# Patient Record
Sex: FEMALE | Race: WHITE | NOT HISPANIC OR LATINO | Employment: FULL TIME | URBAN - METROPOLITAN AREA
[De-identification: names, ages, dates, MRNs, and addresses within clinical notes are randomized per-mention and may not be internally consistent; named-entity substitution may affect disease eponyms.]

---

## 2018-11-15 ENCOUNTER — OFFICE VISIT (OUTPATIENT)
Dept: URGENT CARE | Facility: CLINIC | Age: 33
End: 2018-11-15
Payer: COMMERCIAL

## 2018-11-15 VITALS
WEIGHT: 190 LBS | SYSTOLIC BLOOD PRESSURE: 109 MMHG | DIASTOLIC BLOOD PRESSURE: 68 MMHG | RESPIRATION RATE: 17 BRPM | HEIGHT: 64 IN | TEMPERATURE: 98 F | OXYGEN SATURATION: 98 % | BODY MASS INDEX: 32.44 KG/M2 | HEART RATE: 73 BPM

## 2018-11-15 DIAGNOSIS — N94.6 DYSMENORRHEA: ICD-10-CM

## 2018-11-15 DIAGNOSIS — R10.9 ABDOMINAL CRAMPING: Primary | ICD-10-CM

## 2018-11-15 DIAGNOSIS — Z87.59 HISTORY OF MISCARRIAGE: ICD-10-CM

## 2018-11-15 LAB
B-HCG UR QL: NEGATIVE
BILIRUB UR QL STRIP: NEGATIVE
CTP QC/QA: YES
GLUCOSE UR QL STRIP: NEGATIVE
KETONES UR QL STRIP: NEGATIVE
LEUKOCYTE ESTERASE UR QL STRIP: NEGATIVE
PH, POC UA: 5.5 (ref 5–8)
POC BLOOD, URINE: POSITIVE
POC NITRATES, URINE: NEGATIVE
PROT UR QL STRIP: NEGATIVE
SP GR UR STRIP: 1.02 (ref 1–1.03)
UROBILINOGEN UR STRIP-ACNC: ABNORMAL (ref 0.1–1.1)

## 2018-11-15 PROCEDURE — 81003 URINALYSIS AUTO W/O SCOPE: CPT | Mod: QW,S$GLB,, | Performed by: NURSE PRACTITIONER

## 2018-11-15 PROCEDURE — 99203 OFFICE O/P NEW LOW 30 MIN: CPT | Mod: 25,S$GLB,, | Performed by: NURSE PRACTITIONER

## 2018-11-15 PROCEDURE — 3008F BODY MASS INDEX DOCD: CPT | Mod: CPTII,S$GLB,, | Performed by: NURSE PRACTITIONER

## 2018-11-15 PROCEDURE — 81025 URINE PREGNANCY TEST: CPT | Mod: S$GLB,,, | Performed by: NURSE PRACTITIONER

## 2018-11-15 RX ORDER — NAPROXEN 500 MG/1
500 TABLET ORAL 2 TIMES DAILY WITH MEALS
Qty: 20 TABLET | Refills: 0 | Status: SHIPPED | OUTPATIENT
Start: 2018-11-15 | End: 2018-11-25

## 2018-11-15 NOTE — PATIENT INSTRUCTIONS
Follow up with your doctor in a few days.  Return to the urgent care or go to the ER if symptoms get worse.    Take naproxyn as directed.  Follow up with your ob/gyn as discussed for further testing-ultrasound, possible birth control pills to help with period flow and cramps.          Unknown Causes of Abdominal Pain (Female)    The exact cause of your abdominal (stomach) pain is not clear. This does not mean that this is something to worry about. Everyone likes to know the exact cause of the problem, but sometimes with abdominal pain, there is no clear-cut cause, and this could be a good thing. The good news is that your symptoms can be treated, and you will feel better.   Your condition does not seem serious now; however, sometimes the signs of a serious problem may take more time to appear. For this reason, it is important for you to watch for any new symptoms, problems, or worsening of your condition.  Over the next few days, the abdominal pain may come and go, or be continuous. Other common symptoms can include nausea and vomiting. Sometimes it can be difficult to tell if you feel nauseous, you may just feel bad and not associate that feeling with nausea. Constipation, diarrhea, and a fever may go along with the pain.  The pain may continue even if treated correctly over the following days. Depending on how things go, sometimes the cause can become clear and may require further or different treatment. Additional evaluations, medications, or tests may also be needed.  Home care  Your healthcare provider may prescribe medicine for pain, symptoms, or an infection.  Follow the healthcare provider's instructions for taking these medicines.  General care  · Rest as much as you can until your next exam. No strenuous activities.  · Try to find positions that ease discomfort. A small pillow placed on the abdomen may help relieve pain.  · Something warm on your abdomen (such as a heating pad) may help, but be careful not  to burn yourself.  Diet  · Do not force yourself to eat, especially if having cramps, vomiting, or diarrhea.  · Water is important so you do not get dehydrated. Soup may also be good. Sports drinks may also help, especially if they are not too acidic. Make sure you don't drink sugary drinks as this can make things worse. Take liquids in small amounts. Do not guzzle them.  · Caffeine sometimes makes the pain and cramping worse.  · Avoid dairy products if you have vomiting or diarrhea.  · Don't eat large amounts at a time. Wait a few minutes between bites.  · Eat a diet low in fiber (called a low-residue diet). Foods allowed include refined breads, white rice, fruit and vegetable juices without pulp, tender meats. These foods will pass more easily through the intestine.  · Avoid whole-grain foods, whole fruits and vegetables, meats, seeds and nuts, fried or fatty foods, dairy, alcohol and spicy foods until your symptoms go away.  Follow-up care  Follow up with your healthcare provider, or as advised, if your pain does not begin to improve in the next 24 hours.  Call 911  Call 911 if any of these occur:  · Trouble breathing  · Confusion  · Fainting or loss of consciousness  · Rapid heart rate  · Seizure  When to seek medical advice  Call your healthcare provider right away if any of these occur:  · Pain gets worse or moves to the right lower abdomen  · New or worsening vomiting or diarrhea  · Swelling of the abdomen  · Unable to pass stool for more than 3 days  · Fever of 100.4ºF (38ºC) or higher, or as directed by your healthcare provider.  · Blood in vomit or bowel movements (dark red or black color)  · Jaundice (yellow color of eyes and skin)  · Weakness, dizziness  · Chest, arm, back, neck or jaw pain  · Unexpected vaginal bleeding or missed period  · Can't keep down liquids or water and are getting dehydrated  Date Last Reviewed: 12/30/2015  © 4058-1322 [employer]. [address] [place] [address]  [place] [address]. All rights reserved. This information is not intended as a substitute for professional medical care. Always follow your healthcare professional's instructions.

## 2018-11-15 NOTE — PROGRESS NOTES
"Subjective:       Patient ID: Nimo Miller is a 33 y.o. female.    Vitals:  height is 5' 4" (1.626 m) and weight is 86.2 kg (190 lb). Her oral temperature is 98.4 °F (36.9 °C). Her blood pressure is 109/68 and her pulse is 73. Her respiration is 17 and oxygen saturation is 98%.     Chief Complaint: Dysmenorrhea (clots and miscairrage recently)    Onset from today, passed clots. Reports miscarriage approx 4 months ago with spontaneous resolve-no medication or surgery needed. She started having menses approx 4-6 weeks post miscarriage-reports irregular but light and minimal cramping. Reports this morning started with menses again, cramping, more so on right side, and passing a clot around the size of 1inch. She is currently not on birth control.       Vaginal Bleeding   The patient's primary symptoms include pelvic pain and vaginal bleeding. The patient's pertinent negatives include no missed menses or vaginal discharge. This is a new problem. The current episode started today. The problem occurs constantly. The problem has been unchanged. The pain is severe. Pertinent negatives include no abdominal pain, back pain, chills, dysuria, fever, frequency, hematuria, nausea, rash, urgency or vomiting. The vaginal discharge was bloody. The vaginal bleeding is typical of menses. She has been passing clots. She has not been passing tissue. Nothing aggravates the symptoms. She has tried nothing for the symptoms. She is sexually active. It is unknown whether or not her partner has an STD. She uses nothing for contraception. Her menstrual history has been regular. There is no history of ovarian cysts.       Constitution: Negative for chills and fever.   Neck: Negative for painful lymph nodes.   Gastrointestinal: Negative for abdominal pain, nausea and vomiting.   Genitourinary: Positive for painful menstruation, vaginal bleeding and pelvic pain. Negative for dysuria, frequency, urgency, urine decreased, hematuria, " history of kidney stones, irregular menstruation, missed menses, heavy menstrual bleeding, ovarian cysts, genital trauma, vaginal pain, vaginal discharge, vaginal sores, vaginal odor, painful intercourse, genital sore and painful ejaculation.   Musculoskeletal: Negative for back pain.   Skin: Negative for rash and lesion.   Hematologic/Lymphatic: Negative for swollen lymph nodes.       Objective:      Physical Exam   Constitutional: She is oriented to person, place, and time. She appears well-developed and well-nourished.   HENT:   Head: Normocephalic and atraumatic.   Right Ear: External ear normal.   Left Ear: External ear normal.   Nose: Nose normal.   Eyes: Lids are normal.   Neck: Trachea normal, normal range of motion and phonation normal. Neck supple.   Cardiovascular: Normal pulses.   Pulmonary/Chest: Effort normal.   Abdominal: Soft. Normal appearance and bowel sounds are normal. She exhibits no distension. There is tenderness in the suprapubic area. There is no CVA tenderness.       Neurological: She is alert and oriented to person, place, and time.   Skin: Skin is warm, dry and intact.   Psychiatric: She has a normal mood and affect. Her speech is normal and behavior is normal. Cognition and memory are normal.   Nursing note and vitals reviewed.      Results for orders placed or performed in visit on 11/15/18   POCT Urinalysis, Dipstick, Automated, W/O Scope   Result Value Ref Range    POC Blood, Urine Positive (A) Negative    POC Bilirubin, Urine Negative Negative    POC Urobilinogen, Urine norm 0.1 - 1.1    POC Ketones, Urine Negative Negative    POC Protein, Urine Negative Negative    POC Nitrates, Urine Negative Negative    POC Glucose, Urine Negative Negative    pH, UA 5.5 5 - 8    POC Specific Gravity, Urine 1.020 1.003 - 1.029    POC Leukocytes, Urine Negative Negative   POCT urine pregnancy   Result Value Ref Range    POC Preg Test, Ur Negative Negative     Acceptable Yes         Assessment:       1. Abdominal cramping    2. Dysmenorrhea    3. History of miscarriage        Plan:     advised to follow up with her gyn this week for further workup of dysmenorrhea. ER precautions discussed.    Abdominal cramping  -     POCT Urinalysis, Dipstick, Automated, W/O Scope  -     POCT urine pregnancy  -     naproxen (NAPROSYN) 500 MG tablet; Take 1 tablet (500 mg total) by mouth 2 (two) times daily with meals. for 10 days  Dispense: 20 tablet; Refill: 0    Dysmenorrhea  -     naproxen (NAPROSYN) 500 MG tablet; Take 1 tablet (500 mg total) by mouth 2 (two) times daily with meals. for 10 days  Dispense: 20 tablet; Refill: 0    History of miscarriage      Patient Instructions   Follow up with your doctor in a few days.  Return to the urgent care or go to the ER if symptoms get worse.    Take naproxyn as directed.  Follow up with your ob/gyn as discussed for further testing-ultrasound, possible birth control pills to help with period flow and cramps.          Unknown Causes of Abdominal Pain (Female)    The exact cause of your abdominal (stomach) pain is not clear. This does not mean that this is something to worry about. Everyone likes to know the exact cause of the problem, but sometimes with abdominal pain, there is no clear-cut cause, and this could be a good thing. The good news is that your symptoms can be treated, and you will feel better.   Your condition does not seem serious now; however, sometimes the signs of a serious problem may take more time to appear. For this reason, it is important for you to watch for any new symptoms, problems, or worsening of your condition.  Over the next few days, the abdominal pain may come and go, or be continuous. Other common symptoms can include nausea and vomiting. Sometimes it can be difficult to tell if you feel nauseous, you may just feel bad and not associate that feeling with nausea. Constipation, diarrhea, and a fever may go along with the  pain.  The pain may continue even if treated correctly over the following days. Depending on how things go, sometimes the cause can become clear and may require further or different treatment. Additional evaluations, medications, or tests may also be needed.  Home care  Your healthcare provider may prescribe medicine for pain, symptoms, or an infection.  Follow the healthcare provider's instructions for taking these medicines.  General care  · Rest as much as you can until your next exam. No strenuous activities.  · Try to find positions that ease discomfort. A small pillow placed on the abdomen may help relieve pain.  · Something warm on your abdomen (such as a heating pad) may help, but be careful not to burn yourself.  Diet  · Do not force yourself to eat, especially if having cramps, vomiting, or diarrhea.  · Water is important so you do not get dehydrated. Soup may also be good. Sports drinks may also help, especially if they are not too acidic. Make sure you don't drink sugary drinks as this can make things worse. Take liquids in small amounts. Do not guzzle them.  · Caffeine sometimes makes the pain and cramping worse.  · Avoid dairy products if you have vomiting or diarrhea.  · Don't eat large amounts at a time. Wait a few minutes between bites.  · Eat a diet low in fiber (called a low-residue diet). Foods allowed include refined breads, white rice, fruit and vegetable juices without pulp, tender meats. These foods will pass more easily through the intestine.  · Avoid whole-grain foods, whole fruits and vegetables, meats, seeds and nuts, fried or fatty foods, dairy, alcohol and spicy foods until your symptoms go away.  Follow-up care  Follow up with your healthcare provider, or as advised, if your pain does not begin to improve in the next 24 hours.  Call 911  Call 911 if any of these occur:  · Trouble breathing  · Confusion  · Fainting or loss of consciousness  · Rapid heart rate  · Seizure  When to seek  medical advice  Call your healthcare provider right away if any of these occur:  · Pain gets worse or moves to the right lower abdomen  · New or worsening vomiting or diarrhea  · Swelling of the abdomen  · Unable to pass stool for more than 3 days  · Fever of 100.4ºF (38ºC) or higher, or as directed by your healthcare provider.  · Blood in vomit or bowel movements (dark red or black color)  · Jaundice (yellow color of eyes and skin)  · Weakness, dizziness  · Chest, arm, back, neck or jaw pain  · Unexpected vaginal bleeding or missed period  · Can't keep down liquids or water and are getting dehydrated  Date Last Reviewed: 12/30/2015  © 6241-4339 [employer]. [address] [place] [address], [place] [address]. All rights reserved. This information is not intended as a substitute for professional medical care. Always follow your healthcare professional's instructions.

## 2019-08-14 ENCOUNTER — OFFICE VISIT (OUTPATIENT)
Dept: URGENT CARE | Facility: CLINIC | Age: 34
End: 2019-08-14
Payer: COMMERCIAL

## 2019-08-14 VITALS
HEART RATE: 80 BPM | OXYGEN SATURATION: 100 % | TEMPERATURE: 98 F | HEIGHT: 64 IN | WEIGHT: 190.06 LBS | BODY MASS INDEX: 32.45 KG/M2 | SYSTOLIC BLOOD PRESSURE: 112 MMHG | DIASTOLIC BLOOD PRESSURE: 66 MMHG | RESPIRATION RATE: 20 BRPM

## 2019-08-14 DIAGNOSIS — R09.81 SINUS CONGESTION: ICD-10-CM

## 2019-08-14 DIAGNOSIS — J02.9 VIRAL PHARYNGITIS: Primary | ICD-10-CM

## 2019-08-14 LAB
CTP QC/QA: YES
S PYO RRNA THROAT QL PROBE: NEGATIVE

## 2019-08-14 PROCEDURE — 87880 POCT RAPID STREP A: ICD-10-PCS | Mod: QW,S$GLB,, | Performed by: NURSE PRACTITIONER

## 2019-08-14 PROCEDURE — 99213 PR OFFICE/OUTPT VISIT, EST, LEVL III, 20-29 MIN: ICD-10-PCS | Mod: S$GLB,,, | Performed by: NURSE PRACTITIONER

## 2019-08-14 PROCEDURE — 3008F BODY MASS INDEX DOCD: CPT | Mod: CPTII,S$GLB,, | Performed by: NURSE PRACTITIONER

## 2019-08-14 PROCEDURE — 3008F PR BODY MASS INDEX (BMI) DOCUMENTED: ICD-10-PCS | Mod: CPTII,S$GLB,, | Performed by: NURSE PRACTITIONER

## 2019-08-14 PROCEDURE — 99213 OFFICE O/P EST LOW 20 MIN: CPT | Mod: S$GLB,,, | Performed by: NURSE PRACTITIONER

## 2019-08-14 PROCEDURE — 87880 STREP A ASSAY W/OPTIC: CPT | Mod: QW,S$GLB,, | Performed by: NURSE PRACTITIONER

## 2019-08-14 NOTE — PATIENT INSTRUCTIONS
Please follow up with your Primary care provider within 2-5 days if your signs and symptoms have not resolved or worsen.     If your condition worsens or fails to improve we recommend that you receive another evaluation at the emergency room immediately or contact your primary medical clinic to discuss your concerns.   You must understand that you have received an Urgent Care treatment only and that you may be released before all of your medical problems are known or treated. You, the patient, will arrange for follow up care as instructed.     RED FLAGS/WARNING SYMPTOMS DISCUSSED WITH PATIENT THAT WOULD WARRANT EMERGENT MEDICAL ATTENTION. PATIENT VERBALIZED UNDERSTANDING.       Self-Care for Sore Throats    Sore throats happen for many reasons, such as colds, allergies, and infections caused by viruses or bacteria. In any case, your throat becomes red and sore. Your goal for self-care is to reduce your discomfort while giving your throat a chance to heal.  Moisten and soothe your throat  Tips include the following:  · Try a sip of water first thing after waking up.  · Keep your throat moist by drinking 6 or more glasses of clear liquids every day.  · Run a cool-air humidifier in your room overnight.  · Avoid cigarette smoke.   · Suck on throat lozenges, cough drops, hard candy, ice chips, or frozen fruit-juice bars. Use the sugar-free versions if your diet or medical condition requires them.  Gargle to ease irritation  Gargling every hour or 2 can ease irritation. Try gargling with 1 of these solutions:  · 1/4 teaspoon of salt in 1/2 cup of warm water  · An over-the-counter anesthetic gargle  Use medicine for more relief  Over-the-counter medicine can reduce sore throat symptoms. Ask your pharmacist if you have questions about which medicine to use:  · Ease pain with anesthetic sprays. Aspirin or an aspirin substitute also helps. Remember, never give aspirin to anyone 18 or younger, or if you are already taking  blood thinners.   · For sore throats caused by allergies, try antihistamines to block the allergic reaction.  · Remember: unless a sore throat is caused by a bacterial infection, antibiotics wont help you.  Prevent future sore throats  Prevention tips include the following:  · Stop smoking or reduce contact with secondhand smoke. Smoke irritates the tender throat lining.  · Limit contact with pets and with allergy-causing substances, such as pollen and mold.  · When youre around someone with a sore throat or cold, wash your hands often to keep viruses or bacteria from spreading.  · Dont strain your vocal cords.  Call your healthcare provider  Contact your healthcare provider if you have:  · A temperature over 101°F (38.3°C)  · White spots on the throat  · Great difficulty swallowing  · Trouble breathing  · A skin rash  · Recent exposure to someone else with strep bacteria  · Severe hoarseness and swollen glands in the neck or jaw   Date Last Reviewed: 8/1/2016  © 9101-9843 [employer]. [address] [place] [address], [place] [address]. All rights reserved. This information is not intended as a substitute for professional medical care. Always follow your healthcare professional's instructions.

## 2019-08-14 NOTE — PROGRESS NOTES
"Subjective:       Patient ID: Nimo Miller is a 34 y.o. female.    Vitals:  height is 5' 4" (1.626 m) and weight is 86.2 kg (190 lb 0.6 oz). Her oral temperature is 97.7 °F (36.5 °C). Her blood pressure is 112/66 and her pulse is 80. Her respiration is 20 and oxygen saturation is 100%.     Chief Complaint: Sore Throat    Patient here today stating her Son tested positive to Strep today. Patient states she is not feeling that great and he OB/GYN wanted her to come in.    Sore Throat    This is a new problem. The current episode started yesterday. The problem has been gradually worsening. Associated symptoms include ear pain. Pertinent negatives include no congestion, coughing, diarrhea, headaches, shortness of breath or vomiting. Associated symptoms comments: mild. She has had exposure to strep. Exposure to: Son. She has tried nothing for the symptoms.       Constitution: Negative for chills, fatigue and fever.   HENT: Positive for ear pain and sore throat. Negative for congestion.    Neck: Negative for painful lymph nodes.   Cardiovascular: Negative for chest pain and leg swelling.   Eyes: Negative for double vision and blurred vision.   Respiratory: Negative for cough and shortness of breath.    Gastrointestinal: Negative for nausea, vomiting and diarrhea.   Genitourinary: Negative for dysuria, frequency, urgency and history of kidney stones.   Musculoskeletal: Negative for joint pain, joint swelling, muscle cramps and muscle ache.   Skin: Negative for color change, pale, rash and bruising.   Allergic/Immunologic: Negative for seasonal allergies.   Neurological: Negative for dizziness, history of vertigo, light-headedness, passing out and headaches.   Hematologic/Lymphatic: Negative for swollen lymph nodes.   Psychiatric/Behavioral: Negative for nervous/anxious, sleep disturbance and depression. The patient is not nervous/anxious.        Objective:      Physical Exam   Constitutional: She is oriented to " person, place, and time. She appears well-developed and well-nourished. She is cooperative.  Non-toxic appearance. She does not appear ill. No distress.   HENT:   Head: Normocephalic and atraumatic.   Right Ear: Hearing, tympanic membrane, external ear and ear canal normal.   Left Ear: Hearing, tympanic membrane, external ear and ear canal normal.   Nose: Nose normal. No mucosal edema, rhinorrhea or nasal deformity. No epistaxis. Right sinus exhibits no maxillary sinus tenderness and no frontal sinus tenderness. Left sinus exhibits no maxillary sinus tenderness and no frontal sinus tenderness.   Mouth/Throat: Uvula is midline and mucous membranes are normal. No trismus in the jaw. Normal dentition. No uvula swelling. Posterior oropharyngeal edema and posterior oropharyngeal erythema present. No oropharyngeal exudate. No tonsillar exudate.   Eyes: Conjunctivae and lids are normal. Right eye exhibits no discharge. Left eye exhibits no discharge. No scleral icterus.   Sclera clear bilat   Neck: Trachea normal, normal range of motion, full passive range of motion without pain and phonation normal. Neck supple.   Cardiovascular: Normal rate, regular rhythm, normal heart sounds, intact distal pulses and normal pulses.   Pulmonary/Chest: Effort normal and breath sounds normal. No respiratory distress.   Abdominal: Soft. Normal appearance and bowel sounds are normal. She exhibits no distension, no pulsatile midline mass and no mass. There is no tenderness.   Musculoskeletal: Normal range of motion. She exhibits no edema or deformity.   Neurological: She is alert and oriented to person, place, and time. She exhibits normal muscle tone. Coordination normal.   Skin: Skin is warm, dry and intact. She is not diaphoretic. No pallor.   Psychiatric: She has a normal mood and affect. Her speech is normal and behavior is normal. Judgment and thought content normal. Cognition and memory are normal.   Nursing note and vitals  reviewed.      Assessment:       1. Viral pharyngitis    2. Sinus congestion        Plan:         Viral pharyngitis  -     POCT rapid strep A    Sinus congestion      Results for orders placed or performed in visit on 08/14/19   POCT rapid strep A   Result Value Ref Range    Rapid Strep A Screen Negative Negative     Acceptable Yes          Please follow up with your Primary care provider within 2-5 days if your signs and symptoms have not resolved or worsen.     If your condition worsens or fails to improve we recommend that you receive another evaluation at the emergency room immediately or contact your primary medical clinic to discuss your concerns.   You must understand that you have received an Urgent Care treatment only and that you may be released before all of your medical problems are known or treated. You, the patient, will arrange for follow up care as instructed.     RED FLAGS/WARNING SYMPTOMS DISCUSSED WITH PATIENT THAT WOULD WARRANT EMERGENT MEDICAL ATTENTION. PATIENT VERBALIZED UNDERSTANDING.       Self-Care for Sore Throats    Sore throats happen for many reasons, such as colds, allergies, and infections caused by viruses or bacteria. In any case, your throat becomes red and sore. Your goal for self-care is to reduce your discomfort while giving your throat a chance to heal.  Moisten and soothe your throat  Tips include the following:  · Try a sip of water first thing after waking up.  · Keep your throat moist by drinking 6 or more glasses of clear liquids every day.  · Run a cool-air humidifier in your room overnight.  · Avoid cigarette smoke.   · Suck on throat lozenges, cough drops, hard candy, ice chips, or frozen fruit-juice bars. Use the sugar-free versions if your diet or medical condition requires them.  Gargle to ease irritation  Gargling every hour or 2 can ease irritation. Try gargling with 1 of these solutions:  · 1/4 teaspoon of salt in 1/2 cup of warm water  · An  over-the-counter anesthetic gargle  Use medicine for more relief  Over-the-counter medicine can reduce sore throat symptoms. Ask your pharmacist if you have questions about which medicine to use:  · Ease pain with anesthetic sprays. Aspirin or an aspirin substitute also helps. Remember, never give aspirin to anyone 18 or younger, or if you are already taking blood thinners.   · For sore throats caused by allergies, try antihistamines to block the allergic reaction.  · Remember: unless a sore throat is caused by a bacterial infection, antibiotics wont help you.  Prevent future sore throats  Prevention tips include the following:  · Stop smoking or reduce contact with secondhand smoke. Smoke irritates the tender throat lining.  · Limit contact with pets and with allergy-causing substances, such as pollen and mold.  · When youre around someone with a sore throat or cold, wash your hands often to keep viruses or bacteria from spreading.  · Dont strain your vocal cords.  Call your healthcare provider  Contact your healthcare provider if you have:  · A temperature over 101°F (38.3°C)  · White spots on the throat  · Great difficulty swallowing  · Trouble breathing  · A skin rash  · Recent exposure to someone else with strep bacteria  · Severe hoarseness and swollen glands in the neck or jaw   Date Last Reviewed: 8/1/2016  © 1679-4245 [employer]. [address] [place] [address], [place] [address]. All rights reserved. This information is not intended as a substitute for professional medical care. Always follow your healthcare professional's instructions.

## 2022-02-16 ENCOUNTER — OFFICE VISIT (OUTPATIENT)
Dept: FAMILY MEDICINE | Facility: CLINIC | Age: 37
End: 2022-02-16
Attending: FAMILY MEDICINE
Payer: COMMERCIAL

## 2022-02-16 ENCOUNTER — LAB VISIT (OUTPATIENT)
Dept: LAB | Facility: HOSPITAL | Age: 37
End: 2022-02-16
Attending: FAMILY MEDICINE
Payer: COMMERCIAL

## 2022-02-16 VITALS
DIASTOLIC BLOOD PRESSURE: 64 MMHG | BODY MASS INDEX: 36.14 KG/M2 | OXYGEN SATURATION: 98 % | WEIGHT: 211.69 LBS | SYSTOLIC BLOOD PRESSURE: 102 MMHG | HEART RATE: 85 BPM | HEIGHT: 64 IN

## 2022-02-16 DIAGNOSIS — F41.9 ANXIETY AND DEPRESSION: ICD-10-CM

## 2022-02-16 DIAGNOSIS — F32.A ANXIETY AND DEPRESSION: ICD-10-CM

## 2022-02-16 DIAGNOSIS — Z00.00 ANNUAL PHYSICAL EXAM: ICD-10-CM

## 2022-02-16 DIAGNOSIS — Z00.00 ANNUAL PHYSICAL EXAM: Primary | ICD-10-CM

## 2022-02-16 LAB
ALBUMIN SERPL BCP-MCNC: 3.9 G/DL (ref 3.5–5.2)
ALP SERPL-CCNC: 99 U/L (ref 55–135)
ALT SERPL W/O P-5'-P-CCNC: 12 U/L (ref 10–44)
ANION GAP SERPL CALC-SCNC: 9 MMOL/L (ref 8–16)
AST SERPL-CCNC: 18 U/L (ref 10–40)
BASOPHILS # BLD AUTO: 0.05 K/UL (ref 0–0.2)
BASOPHILS NFR BLD: 0.5 % (ref 0–1.9)
BILIRUB SERPL-MCNC: 0.2 MG/DL (ref 0.1–1)
BILIRUB UR QL STRIP: NEGATIVE
BUN SERPL-MCNC: 15 MG/DL (ref 6–20)
CALCIUM SERPL-MCNC: 9.9 MG/DL (ref 8.7–10.5)
CHLORIDE SERPL-SCNC: 104 MMOL/L (ref 95–110)
CLARITY UR REFRACT.AUTO: CLEAR
CO2 SERPL-SCNC: 27 MMOL/L (ref 23–29)
COLOR UR AUTO: COLORLESS
CREAT SERPL-MCNC: 0.8 MG/DL (ref 0.5–1.4)
DIFFERENTIAL METHOD: ABNORMAL
EOSINOPHIL # BLD AUTO: 0.3 K/UL (ref 0–0.5)
EOSINOPHIL NFR BLD: 2.7 % (ref 0–8)
ERYTHROCYTE [DISTWIDTH] IN BLOOD BY AUTOMATED COUNT: 12.6 % (ref 11.5–14.5)
EST. GFR  (AFRICAN AMERICAN): >60 ML/MIN/1.73 M^2
EST. GFR  (NON AFRICAN AMERICAN): >60 ML/MIN/1.73 M^2
GLUCOSE SERPL-MCNC: 75 MG/DL (ref 70–110)
GLUCOSE UR QL STRIP: NEGATIVE
HCT VFR BLD AUTO: 39 % (ref 37–48.5)
HGB BLD-MCNC: 12.3 G/DL (ref 12–16)
HGB UR QL STRIP: NEGATIVE
IMM GRANULOCYTES # BLD AUTO: 0.03 K/UL (ref 0–0.04)
IMM GRANULOCYTES NFR BLD AUTO: 0.3 % (ref 0–0.5)
KETONES UR QL STRIP: NEGATIVE
LEUKOCYTE ESTERASE UR QL STRIP: NEGATIVE
LYMPHOCYTES # BLD AUTO: 2.6 K/UL (ref 1–4.8)
LYMPHOCYTES NFR BLD: 25.4 % (ref 18–48)
MCH RBC QN AUTO: 29.6 PG (ref 27–31)
MCHC RBC AUTO-ENTMCNC: 31.5 G/DL (ref 32–36)
MCV RBC AUTO: 94 FL (ref 82–98)
MONOCYTES # BLD AUTO: 0.6 K/UL (ref 0.3–1)
MONOCYTES NFR BLD: 5.6 % (ref 4–15)
NEUTROPHILS # BLD AUTO: 6.8 K/UL (ref 1.8–7.7)
NEUTROPHILS NFR BLD: 65.5 % (ref 38–73)
NITRITE UR QL STRIP: NEGATIVE
NRBC BLD-RTO: 0 /100 WBC
PH UR STRIP: 7 [PH] (ref 5–8)
PLATELET # BLD AUTO: 304 K/UL (ref 150–450)
PMV BLD AUTO: 10.1 FL (ref 9.2–12.9)
POTASSIUM SERPL-SCNC: 4.2 MMOL/L (ref 3.5–5.1)
PROT SERPL-MCNC: 7.9 G/DL (ref 6–8.4)
PROT UR QL STRIP: NEGATIVE
RBC # BLD AUTO: 4.16 M/UL (ref 4–5.4)
SODIUM SERPL-SCNC: 140 MMOL/L (ref 136–145)
SP GR UR STRIP: 1 (ref 1–1.03)
TSH SERPL DL<=0.005 MIU/L-ACNC: 2.26 UIU/ML (ref 0.4–4)
URN SPEC COLLECT METH UR: ABNORMAL
WBC # BLD AUTO: 10.31 K/UL (ref 3.9–12.7)

## 2022-02-16 PROCEDURE — 81003 URINALYSIS AUTO W/O SCOPE: CPT | Performed by: FAMILY MEDICINE

## 2022-02-16 PROCEDURE — 1159F MED LIST DOCD IN RCRD: CPT | Mod: CPTII,S$GLB,, | Performed by: FAMILY MEDICINE

## 2022-02-16 PROCEDURE — 99385 PREV VISIT NEW AGE 18-39: CPT | Mod: S$GLB,,, | Performed by: FAMILY MEDICINE

## 2022-02-16 PROCEDURE — 87389 HIV-1 AG W/HIV-1&-2 AB AG IA: CPT | Performed by: FAMILY MEDICINE

## 2022-02-16 PROCEDURE — 36415 COLL VENOUS BLD VENIPUNCTURE: CPT | Mod: PO | Performed by: FAMILY MEDICINE

## 2022-02-16 PROCEDURE — 1160F PR REVIEW ALL MEDS BY PRESCRIBER/CLIN PHARMACIST DOCUMENTED: ICD-10-PCS | Mod: CPTII,S$GLB,, | Performed by: FAMILY MEDICINE

## 2022-02-16 PROCEDURE — 86803 HEPATITIS C AB TEST: CPT | Performed by: FAMILY MEDICINE

## 2022-02-16 PROCEDURE — 3008F BODY MASS INDEX DOCD: CPT | Mod: CPTII,S$GLB,, | Performed by: FAMILY MEDICINE

## 2022-02-16 PROCEDURE — 99999 PR PBB SHADOW E&M-EST. PATIENT-LVL III: ICD-10-PCS | Mod: PBBFAC,,, | Performed by: FAMILY MEDICINE

## 2022-02-16 PROCEDURE — 80053 COMPREHEN METABOLIC PANEL: CPT | Performed by: FAMILY MEDICINE

## 2022-02-16 PROCEDURE — 99385 PR PREVENTIVE VISIT,NEW,18-39: ICD-10-PCS | Mod: S$GLB,,, | Performed by: FAMILY MEDICINE

## 2022-02-16 PROCEDURE — 1159F PR MEDICATION LIST DOCUMENTED IN MEDICAL RECORD: ICD-10-PCS | Mod: CPTII,S$GLB,, | Performed by: FAMILY MEDICINE

## 2022-02-16 PROCEDURE — 99999 PR PBB SHADOW E&M-EST. PATIENT-LVL III: CPT | Mod: PBBFAC,,, | Performed by: FAMILY MEDICINE

## 2022-02-16 PROCEDURE — 84443 ASSAY THYROID STIM HORMONE: CPT | Performed by: FAMILY MEDICINE

## 2022-02-16 PROCEDURE — 3074F PR MOST RECENT SYSTOLIC BLOOD PRESSURE < 130 MM HG: ICD-10-PCS | Mod: CPTII,S$GLB,, | Performed by: FAMILY MEDICINE

## 2022-02-16 PROCEDURE — 85025 COMPLETE CBC W/AUTO DIFF WBC: CPT | Performed by: FAMILY MEDICINE

## 2022-02-16 PROCEDURE — 1160F RVW MEDS BY RX/DR IN RCRD: CPT | Mod: CPTII,S$GLB,, | Performed by: FAMILY MEDICINE

## 2022-02-16 PROCEDURE — 3078F DIAST BP <80 MM HG: CPT | Mod: CPTII,S$GLB,, | Performed by: FAMILY MEDICINE

## 2022-02-16 PROCEDURE — 3008F PR BODY MASS INDEX (BMI) DOCUMENTED: ICD-10-PCS | Mod: CPTII,S$GLB,, | Performed by: FAMILY MEDICINE

## 2022-02-16 PROCEDURE — 3074F SYST BP LT 130 MM HG: CPT | Mod: CPTII,S$GLB,, | Performed by: FAMILY MEDICINE

## 2022-02-16 PROCEDURE — 3078F PR MOST RECENT DIASTOLIC BLOOD PRESSURE < 80 MM HG: ICD-10-PCS | Mod: CPTII,S$GLB,, | Performed by: FAMILY MEDICINE

## 2022-02-16 RX ORDER — SERTRALINE HYDROCHLORIDE 100 MG/1
TABLET, FILM COATED ORAL
COMMUNITY
Start: 2022-01-05 | End: 2022-04-01 | Stop reason: SDUPTHER

## 2022-02-16 RX ORDER — SERTRALINE HYDROCHLORIDE 50 MG/1
50 TABLET, FILM COATED ORAL DAILY
COMMUNITY
End: 2022-04-01

## 2022-02-17 LAB — HIV 1+2 AB+HIV1 P24 AG SERPL QL IA: NEGATIVE

## 2022-02-18 LAB — HCV AB SERPL QL IA: NEGATIVE

## 2022-02-21 ENCOUNTER — TELEPHONE (OUTPATIENT)
Dept: FAMILY MEDICINE | Facility: CLINIC | Age: 37
End: 2022-02-21
Payer: COMMERCIAL

## 2022-02-21 NOTE — TELEPHONE ENCOUNTER
----- Message from Deborah Hernadez MD sent at 2/21/2022 10:13 AM CST -----  Regarding: results  Please let pt know nothing acute on labs good news

## 2022-02-21 NOTE — PROGRESS NOTES
"Subjective:       Patient ID: Nimo Miller is a 37 y.o. female.    Chief Complaint: Establish Care    HPI   Pt with depression followed in psych  is here for annual exam establish care.  No si/hi no panic attacks stable on ssri   pt is generally well no sob/cp no change in bowel habits no brbpr  Pt denies dysuria hematuria no abnl vaginal bleeding  Pt denies n/v/f/c/d/c no cough chest congestion no sore throat    Review of Systems   Constitutional: Negative for activity change, chills, diaphoresis, fatigue and fever.   HENT: Negative for congestion, ear discharge, ear pain, hearing loss, postnasal drip, rhinorrhea, sinus pressure, sneezing, sore throat, trouble swallowing and voice change.    Eyes: Negative for photophobia, discharge, redness, itching and visual disturbance.   Respiratory: Negative for cough, chest tightness, shortness of breath and wheezing.    Cardiovascular: Negative for chest pain, palpitations and leg swelling.   Gastrointestinal: Negative for abdominal pain, anal bleeding, blood in stool, constipation, diarrhea, nausea, rectal pain and vomiting.   Genitourinary: Negative for dyspareunia, dysuria, flank pain, frequency, hematuria, menstrual problem, pelvic pain, urgency, vaginal bleeding and vaginal discharge.   Musculoskeletal: Negative for arthralgias, back pain, joint swelling and neck pain.   Skin: Negative for color change and rash.   Neurological: Negative for dizziness, speech difficulty, weakness, light-headedness, numbness and headaches.   Hematological: Does not bruise/bleed easily.   Psychiatric/Behavioral: Negative for agitation, confusion, decreased concentration, sleep disturbance and suicidal ideas. The patient is not nervous/anxious.        Objective:     /64   Pulse 85   Ht 5' 4" (1.626 m)   Wt 96 kg (211 lb 11.2 oz)   LMP 01/26/2022   SpO2 98%   Breastfeeding Unknown   BMI 36.34 kg/m²     Physical Exam  Constitutional:       Appearance: Normal appearance. She " "is well-developed. She is not ill-appearing.   HENT:      Head: Normocephalic and atraumatic.      Right Ear: Tympanic membrane and external ear normal.      Left Ear: Tympanic membrane and external ear normal.      Nose: Nose normal.   Eyes:      General:         Right eye: No discharge.         Left eye: No discharge.      Conjunctiva/sclera: Conjunctivae normal.      Pupils: Pupils are equal, round, and reactive to light.   Neck:      Thyroid: No thyromegaly.   Cardiovascular:      Rate and Rhythm: Normal rate and regular rhythm.      Heart sounds: Normal heart sounds. No murmur heard.    No friction rub. No gallop.   Pulmonary:      Effort: Pulmonary effort is normal.      Breath sounds: Normal breath sounds. No wheezing or rales.   Abdominal:      General: Bowel sounds are normal. There is no distension.      Palpations: Abdomen is soft.      Tenderness: There is no abdominal tenderness. There is no guarding or rebound.   Genitourinary:     Vagina: Normal.      Comments: declined  Musculoskeletal:         General: No tenderness. Normal range of motion.      Cervical back: Normal range of motion and neck supple.   Lymphadenopathy:      Cervical: No cervical adenopathy.   Skin:     General: Skin is warm and dry.      Findings: No erythema or rash.   Neurological:      General: No focal deficit present.      Mental Status: She is alert.      Cranial Nerves: No cranial nerve deficit.      Motor: No abnormal muscle tone.      Coordination: Coordination normal.   Psychiatric:         Behavior: Behavior normal.         Thought Content: Thought content normal.         Judgment: Judgment normal.         Assessment:       1. Annual physical exam        Plan:     orderse cbc cmp lipid tsh urine  Cont meds  Low fat diet  Graded exercise  rtc annually     Health maintenance  Lipid ordered  Flu in fall  Tetanus q 10 years  papwith gyn  Breast exam with pap  Mammogram at 38       "This note will not be shared with the " "patient."     "

## 2022-04-01 ENCOUNTER — OFFICE VISIT (OUTPATIENT)
Dept: FAMILY MEDICINE | Facility: CLINIC | Age: 37
End: 2022-04-01
Attending: FAMILY MEDICINE
Payer: COMMERCIAL

## 2022-04-01 DIAGNOSIS — F32.A ANXIETY AND DEPRESSION: Primary | ICD-10-CM

## 2022-04-01 DIAGNOSIS — F41.9 ANXIETY AND DEPRESSION: Primary | ICD-10-CM

## 2022-04-01 PROCEDURE — 3008F BODY MASS INDEX DOCD: CPT | Mod: CPTII,95,, | Performed by: FAMILY MEDICINE

## 2022-04-01 PROCEDURE — 3008F PR BODY MASS INDEX (BMI) DOCUMENTED: ICD-10-PCS | Mod: CPTII,95,, | Performed by: FAMILY MEDICINE

## 2022-04-01 PROCEDURE — 99213 OFFICE O/P EST LOW 20 MIN: CPT | Mod: 95,,, | Performed by: FAMILY MEDICINE

## 2022-04-01 PROCEDURE — 99213 PR OFFICE/OUTPT VISIT, EST, LEVL III, 20-29 MIN: ICD-10-PCS | Mod: 95,,, | Performed by: FAMILY MEDICINE

## 2022-04-01 RX ORDER — SERTRALINE HYDROCHLORIDE 100 MG/1
200 TABLET, FILM COATED ORAL DAILY
Qty: 180 TABLET | Refills: 3 | Status: SHIPPED | OUTPATIENT
Start: 2022-04-01 | End: 2023-04-09

## 2022-04-19 VITALS — RESPIRATION RATE: 16 BRPM | TEMPERATURE: 99 F | WEIGHT: 211 LBS | HEIGHT: 64 IN | BODY MASS INDEX: 36.02 KG/M2

## 2022-04-19 PROBLEM — F32.A ANXIETY AND DEPRESSION: Status: ACTIVE | Noted: 2022-04-19

## 2022-04-19 PROBLEM — F41.9 ANXIETY AND DEPRESSION: Status: ACTIVE | Noted: 2022-04-19

## 2022-04-19 NOTE — PROGRESS NOTES
Subjective:    The patient location is: home  The chief complaint leading to consultation is: depression    Visit type: {TELE AUDIOVISUAL    Face to Face time with patient: 30 minutes of total time spent on the encounter, which includes face to face time and non-face to face time preparing to see the patient (eg, review of tests), Obtaining and/or reviewing separately obtained history, Documenting clinical information in the electronic or other health record, Independently interpreting results (not separately reported) and communicating results to the patient/family/caregiver, or Care coordination (not separately reported).         Each patient to whom he or she provides medical services by telemedicine is:  (1) informed of the relationship between the physician and patient and the respective role of any other health care provider with respect to management of the patient; and (2) notified that he or she may decline to receive medical services by telemedicine and may withdraw from such care at any time.    Notes:      Patient ID: Nimo Miller is a 37 y.o. female.    Chief Complaint: Anxiety    HPI   Pt is here for follow up of anxiety depression pt is on 100 mg and 50 mg of zoloft she feels it is not working as well as it has and would like to increase to 200 mg qd  Pt denies panic attacks no si/hi she has been followed in psych   Review of Systems   Constitutional: Positive for activity change. Negative for chills, fatigue, fever and unexpected weight change.   HENT: Negative for hearing loss, rhinorrhea and trouble swallowing.    Eyes: Negative for discharge and visual disturbance.   Respiratory: Negative for cough, chest tightness, shortness of breath and wheezing.    Cardiovascular: Negative for chest pain and palpitations.   Gastrointestinal: Negative for blood in stool, constipation, diarrhea and vomiting.   Endocrine: Negative for polydipsia and polyuria.   Genitourinary: Negative for menstrual problem.  "  Musculoskeletal: Negative for arthralgias, joint swelling and neck pain.   Psychiatric/Behavioral: Positive for dysphoric mood. Negative for confusion, sleep disturbance and suicidal ideas. The patient is not nervous/anxious.        Objective:     Temp 98.6 °F (37 °C)   Resp 16   Ht 5' 4" (1.626 m)   Wt 95.7 kg (211 lb)   BMI 36.22 kg/m²     Physical Exam  Constitutional:       Appearance: Normal appearance.   HENT:      Head: Normocephalic and atraumatic.      Nose: Nose normal.   Pulmonary:      Effort: Pulmonary effort is normal.   Neurological:      General: No focal deficit present.      Mental Status: She is alert and oriented to person, place, and time.      Cranial Nerves: No cranial nerve deficit.      Coordination: Coordination normal.   Psychiatric:         Mood and Affect: Mood normal.         Behavior: Behavior normal.         Thought Content: Thought content normal.         Judgment: Judgment normal.         Assessment:       1. Anxiety and depression        Plan:     orders declined  Cont meds  Avoid caffeine and etoh  Increase water intake  Graded exercise  rtc 1 month  F/u psych        "This note will not be shared with the patient."     "

## 2022-05-05 ENCOUNTER — OFFICE VISIT (OUTPATIENT)
Dept: FAMILY MEDICINE | Facility: CLINIC | Age: 37
End: 2022-05-05
Attending: FAMILY MEDICINE
Payer: COMMERCIAL

## 2022-05-05 DIAGNOSIS — F32.A ANXIETY AND DEPRESSION: Primary | ICD-10-CM

## 2022-05-05 DIAGNOSIS — F41.9 ANXIETY AND DEPRESSION: Primary | ICD-10-CM

## 2022-05-05 PROCEDURE — 99213 PR OFFICE/OUTPT VISIT, EST, LEVL III, 20-29 MIN: ICD-10-PCS | Mod: 95,,, | Performed by: FAMILY MEDICINE

## 2022-05-05 PROCEDURE — 1159F PR MEDICATION LIST DOCUMENTED IN MEDICAL RECORD: ICD-10-PCS | Mod: CPTII,95,, | Performed by: FAMILY MEDICINE

## 2022-05-05 PROCEDURE — 99213 OFFICE O/P EST LOW 20 MIN: CPT | Mod: 95,,, | Performed by: FAMILY MEDICINE

## 2022-05-05 PROCEDURE — 1160F RVW MEDS BY RX/DR IN RCRD: CPT | Mod: CPTII,95,, | Performed by: FAMILY MEDICINE

## 2022-05-05 PROCEDURE — 1159F MED LIST DOCD IN RCRD: CPT | Mod: CPTII,95,, | Performed by: FAMILY MEDICINE

## 2022-05-05 PROCEDURE — 1160F PR REVIEW ALL MEDS BY PRESCRIBER/CLIN PHARMACIST DOCUMENTED: ICD-10-PCS | Mod: CPTII,95,, | Performed by: FAMILY MEDICINE

## 2022-05-05 RX ORDER — DESOGESTREL AND ETHINYL ESTRADIOL 0.15-0.03
1 KIT ORAL DAILY
Qty: 30 TABLET | Refills: 11
Start: 2022-05-05 | End: 2023-10-23

## 2022-05-06 NOTE — PROGRESS NOTES
The patient location is: home  The chief complaint leading to consultation is: anxiety    Visit type: {TELE AUDIOVISUAL    Face to Face time with patient: 20 minutes of total time spent on the encounter, which includes face to face time and non-face to face time preparing to see the patient (eg, review of tests), Obtaining and/or reviewing separately obtained history, Documenting clinical information in the electronic or other health record, Independently interpreting results (not separately reported) and communicating results to the patient/family/caregiver, or Care coordination (not separately reported).         Each patient to whom he or she provides medical services by telemedicine is:  (1) informed of the relationship between the physician and patient and the respective role of any other health care provider with respect to management of the patient; and (2) notified that he or she may decline to receive medical services by telemedicine and may withdraw from such care at any time.    Notes:   Subjective:       Patient ID: Nimo Miller is a 37 y.o. female.    Chief Complaint: Anxiety    HPI   Pt in virtual visit for follow up of anxiety depression we increased her zoloft to 200 mt qd she is feeling better more in control but attributes it to a change in ocp's she is overall well no si/hi no panic attacks would like to continue same dose  Review of Systems   Constitutional: Positive for activity change. Negative for chills, fatigue, fever and unexpected weight change.   HENT: Negative for hearing loss, rhinorrhea and trouble swallowing.    Eyes: Negative for discharge and visual disturbance.   Respiratory: Negative for cough, chest tightness and wheezing.    Cardiovascular: Negative for chest pain and palpitations.   Gastrointestinal: Negative for blood in stool, constipation, diarrhea and vomiting.   Endocrine: Negative for polydipsia and polyuria.   Genitourinary: Negative for difficulty urinating,  "dysuria, hematuria and menstrual problem.   Musculoskeletal: Negative for arthralgias, joint swelling and neck pain.   Neurological: Negative for weakness and headaches.   Psychiatric/Behavioral: Negative for confusion, dysphoric mood, sleep disturbance and suicidal ideas. The patient is not nervous/anxious.        Objective:     Temp (P) 98.6 °F (37 °C)   Resp (P) 16   Ht (P) 5' 4" (1.626 m)   Wt (P) 90.7 kg (200 lb)   BMI (P) 34.33 kg/m²     Physical Exam  Constitutional:       Appearance: Normal appearance. She is not ill-appearing.   HENT:      Head: Normocephalic and atraumatic.      Nose: Nose normal.   Eyes:      Extraocular Movements: Extraocular movements intact.   Pulmonary:      Effort: Pulmonary effort is normal. No respiratory distress.   Neurological:      General: No focal deficit present.      Mental Status: She is alert and oriented to person, place, and time.      Cranial Nerves: No cranial nerve deficit.      Coordination: Coordination normal.   Psychiatric:         Mood and Affect: Mood normal.         Behavior: Behavior normal.         Thought Content: Thought content normal.         Judgment: Judgment normal.         Assessment:       1. Anxiety and depression        Plan:     orders declined  Cont meds  Relaxation techniques  Avoid caffeine and etoh  Graded exercise  Increase water intake  rtc 6 months       "This note will not be shared with the patient."   "

## 2022-06-23 LAB
HPV MRNA E6/E7: NOT DETECTED
PAP RECOMMENDATION EXT: NORMAL

## 2022-08-24 ENCOUNTER — PATIENT MESSAGE (OUTPATIENT)
Dept: INTERNAL MEDICINE | Facility: CLINIC | Age: 37
End: 2022-08-24
Payer: COMMERCIAL

## 2022-08-25 ENCOUNTER — PATIENT OUTREACH (OUTPATIENT)
Dept: INTERNAL MEDICINE | Facility: CLINIC | Age: 37
End: 2022-08-25
Payer: COMMERCIAL

## 2022-09-01 ENCOUNTER — PATIENT OUTREACH (OUTPATIENT)
Dept: INTERNAL MEDICINE | Facility: CLINIC | Age: 37
End: 2022-09-01
Payer: COMMERCIAL

## 2022-09-01 ENCOUNTER — PATIENT MESSAGE (OUTPATIENT)
Dept: INTERNAL MEDICINE | Facility: CLINIC | Age: 37
End: 2022-09-01
Payer: COMMERCIAL

## 2022-09-01 DIAGNOSIS — Z13.220 ENCOUNTER FOR LIPID SCREENING FOR CARDIOVASCULAR DISEASE: Primary | ICD-10-CM

## 2022-09-01 DIAGNOSIS — Z13.6 ENCOUNTER FOR LIPID SCREENING FOR CARDIOVASCULAR DISEASE: Primary | ICD-10-CM

## 2022-11-09 ENCOUNTER — OFFICE VISIT (OUTPATIENT)
Dept: FAMILY MEDICINE | Facility: CLINIC | Age: 37
End: 2022-11-09
Attending: FAMILY MEDICINE
Payer: COMMERCIAL

## 2022-11-09 ENCOUNTER — LAB VISIT (OUTPATIENT)
Dept: LAB | Facility: HOSPITAL | Age: 37
End: 2022-11-09
Attending: FAMILY MEDICINE
Payer: COMMERCIAL

## 2022-11-09 VITALS
OXYGEN SATURATION: 98 % | WEIGHT: 194 LBS | BODY MASS INDEX: 33.12 KG/M2 | DIASTOLIC BLOOD PRESSURE: 74 MMHG | HEART RATE: 92 BPM | SYSTOLIC BLOOD PRESSURE: 110 MMHG | HEIGHT: 64 IN

## 2022-11-09 DIAGNOSIS — F41.9 ANXIETY AND DEPRESSION: ICD-10-CM

## 2022-11-09 DIAGNOSIS — F32.A ANXIETY AND DEPRESSION: ICD-10-CM

## 2022-11-09 DIAGNOSIS — E78.00 HYPERCHOLESTEROLEMIA: ICD-10-CM

## 2022-11-09 DIAGNOSIS — E78.00 HYPERCHOLESTEROLEMIA: Primary | ICD-10-CM

## 2022-11-09 LAB
CHOLEST SERPL-MCNC: 214 MG/DL (ref 120–199)
CHOLEST/HDLC SERPL: 4 {RATIO} (ref 2–5)
HDLC SERPL-MCNC: 54 MG/DL (ref 40–75)
HDLC SERPL: 25.2 % (ref 20–50)
IGA SERPL-MCNC: 203 MG/DL (ref 40–350)
IGG SERPL-MCNC: 1140 MG/DL (ref 650–1600)
IGM SERPL-MCNC: 146 MG/DL (ref 50–300)
LDLC SERPL CALC-MCNC: 130.2 MG/DL (ref 63–159)
NONHDLC SERPL-MCNC: 160 MG/DL
TRIGL SERPL-MCNC: 149 MG/DL (ref 30–150)

## 2022-11-09 PROCEDURE — 3074F PR MOST RECENT SYSTOLIC BLOOD PRESSURE < 130 MM HG: ICD-10-PCS | Mod: CPTII,S$GLB,, | Performed by: FAMILY MEDICINE

## 2022-11-09 PROCEDURE — 3008F PR BODY MASS INDEX (BMI) DOCUMENTED: ICD-10-PCS | Mod: CPTII,S$GLB,, | Performed by: FAMILY MEDICINE

## 2022-11-09 PROCEDURE — 99213 PR OFFICE/OUTPT VISIT, EST, LEVL III, 20-29 MIN: ICD-10-PCS | Mod: S$GLB,,, | Performed by: FAMILY MEDICINE

## 2022-11-09 PROCEDURE — 99999 PR PBB SHADOW E&M-EST. PATIENT-LVL IV: ICD-10-PCS | Mod: PBBFAC,,, | Performed by: FAMILY MEDICINE

## 2022-11-09 PROCEDURE — 3008F BODY MASS INDEX DOCD: CPT | Mod: CPTII,S$GLB,, | Performed by: FAMILY MEDICINE

## 2022-11-09 PROCEDURE — 99999 PR PBB SHADOW E&M-EST. PATIENT-LVL IV: CPT | Mod: PBBFAC,,, | Performed by: FAMILY MEDICINE

## 2022-11-09 PROCEDURE — 99213 OFFICE O/P EST LOW 20 MIN: CPT | Mod: S$GLB,,, | Performed by: FAMILY MEDICINE

## 2022-11-09 PROCEDURE — 3074F SYST BP LT 130 MM HG: CPT | Mod: CPTII,S$GLB,, | Performed by: FAMILY MEDICINE

## 2022-11-09 PROCEDURE — 82784 ASSAY IGA/IGD/IGG/IGM EACH: CPT | Performed by: FAMILY MEDICINE

## 2022-11-09 PROCEDURE — 1160F PR REVIEW ALL MEDS BY PRESCRIBER/CLIN PHARMACIST DOCUMENTED: ICD-10-PCS | Mod: CPTII,S$GLB,, | Performed by: FAMILY MEDICINE

## 2022-11-09 PROCEDURE — 36415 COLL VENOUS BLD VENIPUNCTURE: CPT | Mod: PO | Performed by: FAMILY MEDICINE

## 2022-11-09 PROCEDURE — 1160F RVW MEDS BY RX/DR IN RCRD: CPT | Mod: CPTII,S$GLB,, | Performed by: FAMILY MEDICINE

## 2022-11-09 PROCEDURE — 80061 LIPID PANEL: CPT | Performed by: FAMILY MEDICINE

## 2022-11-09 PROCEDURE — 1159F PR MEDICATION LIST DOCUMENTED IN MEDICAL RECORD: ICD-10-PCS | Mod: CPTII,S$GLB,, | Performed by: FAMILY MEDICINE

## 2022-11-09 PROCEDURE — 3078F DIAST BP <80 MM HG: CPT | Mod: CPTII,S$GLB,, | Performed by: FAMILY MEDICINE

## 2022-11-09 PROCEDURE — 1159F MED LIST DOCD IN RCRD: CPT | Mod: CPTII,S$GLB,, | Performed by: FAMILY MEDICINE

## 2022-11-09 PROCEDURE — 3078F PR MOST RECENT DIASTOLIC BLOOD PRESSURE < 80 MM HG: ICD-10-PCS | Mod: CPTII,S$GLB,, | Performed by: FAMILY MEDICINE

## 2022-11-09 RX ORDER — SERTRALINE HYDROCHLORIDE 100 MG/1
50 TABLET, FILM COATED ORAL
COMMUNITY
End: 2022-11-09 | Stop reason: SDUPTHER

## 2022-11-09 RX ORDER — ALBUTEROL SULFATE 90 UG/1
2 AEROSOL, METERED RESPIRATORY (INHALATION)
COMMUNITY
End: 2023-10-23

## 2022-11-09 NOTE — PROGRESS NOTES
"Subjective:       Patient ID: Nimo Miller is a 37 y.o. female.    Chief Complaint: Anxiety    Anxiety  Symptoms include nervous/anxious behavior. Patient reports no chest pain, palpitations or shortness of breath.       Pt is here for concern about anxiety depression pt is on zoloft she is seeing a therapis but her mood changes are interfering with her work and home life  Pt denies si/hi no panic attacks   Pt has hypercholesterolemia not on low fat diet consistently she is fasting to get her lipid panel drawn  Pt would like to see how her immune status is as she tends to get everythng her kids bring home she is currently feeling well   Review of Systems   Constitutional:  Negative for chills, fatigue and fever.   Respiratory:  Negative for cough, chest tightness and shortness of breath.    Cardiovascular:  Negative for chest pain and palpitations.   Gastrointestinal:  Negative for abdominal distention, abdominal pain and blood in stool.   Psychiatric/Behavioral:  Positive for dysphoric mood. The patient is nervous/anxious.      Objective:    /74   Pulse 92   Ht 5' 4" (1.626 m)   Wt 88 kg (194 lb)   LMP 10/28/2022   SpO2 98%   BMI 33.30 kg/m²     Physical Exam  Constitutional:       Appearance: She is obese. She is not ill-appearing.   HENT:      Head: Normocephalic and atraumatic.   Eyes:      Extraocular Movements: Extraocular movements intact.   Neurological:      General: No focal deficit present.      Mental Status: She is alert and oriented to person, place, and time.      Cranial Nerves: No cranial nerve deficit.      Coordination: Coordination normal.   Psychiatric:         Mood and Affect: Mood normal.         Behavior: Behavior normal.         Thought Content: Thought content normal.         Judgment: Judgment normal.       Assessment:       1. Hypercholesterolemia    2. Anxiety and depression          Plan:     Orders cmp igg igm iga  Cont meds  F/u psych  Relaxation techniques  Avoid " "caffeine and etoh  Rtc annualy and prn        "This note will not be shared with the patient."       "

## 2022-12-12 ENCOUNTER — PATIENT MESSAGE (OUTPATIENT)
Dept: FAMILY MEDICINE | Facility: CLINIC | Age: 37
End: 2022-12-12
Payer: COMMERCIAL

## 2022-12-14 ENCOUNTER — TELEPHONE (OUTPATIENT)
Dept: FAMILY MEDICINE | Facility: CLINIC | Age: 37
End: 2022-12-14
Payer: COMMERCIAL

## 2022-12-14 DIAGNOSIS — R51.9 WORSENING HEADACHES: Primary | ICD-10-CM

## 2022-12-29 ENCOUNTER — TELEPHONE (OUTPATIENT)
Dept: FAMILY MEDICINE | Facility: CLINIC | Age: 37
End: 2022-12-29
Payer: COMMERCIAL

## 2023-01-26 DIAGNOSIS — Z12.31 OTHER SCREENING MAMMOGRAM: ICD-10-CM

## 2023-04-09 RX ORDER — SERTRALINE HYDROCHLORIDE 100 MG/1
200 TABLET, FILM COATED ORAL DAILY
Qty: 180 TABLET | Refills: 1 | Status: SHIPPED | OUTPATIENT
Start: 2023-04-09 | End: 2023-10-02 | Stop reason: SDUPTHER

## 2023-04-09 NOTE — TELEPHONE ENCOUNTER
Refill Decision Note   Nimo Miller  is requesting a refill authorization.  Brief Assessment and Rationale for Refill:  Approve     Medication Therapy Plan:       Medication Reconciliation Completed: No   Comments:     No Care Gaps recommended.     Note composed:1:33 PM 04/09/2023

## 2023-04-09 NOTE — TELEPHONE ENCOUNTER
No new care gaps identified.  [facility] Embedded Care Gaps. Reference number: [id number]. 4/09/2023   1:29:34 PM CDT

## 2023-10-02 ENCOUNTER — PATIENT MESSAGE (OUTPATIENT)
Dept: OBSTETRICS AND GYNECOLOGY | Facility: CLINIC | Age: 38
End: 2023-10-02
Payer: COMMERCIAL

## 2023-10-02 ENCOUNTER — TELEPHONE (OUTPATIENT)
Dept: OBSTETRICS AND GYNECOLOGY | Facility: CLINIC | Age: 38
End: 2023-10-02
Payer: COMMERCIAL

## 2023-10-02 NOTE — TELEPHONE ENCOUNTER
Unable to retrieve patient chart and identify care due.  [facility] Embedded Care Due Messages. Reference number: [id number].   10/02/2023 1:36:36 PM CDT

## 2023-10-02 NOTE — TELEPHONE ENCOUNTER
I called patient to cancel appointment and ask if she was okay with going through the hormone clinic. She verbally agreed. I also informed the patient that some one will reach out to her to get her scheduled with the hormone clinic.    Akila

## 2023-10-03 RX ORDER — SERTRALINE HYDROCHLORIDE 100 MG/1
200 TABLET, FILM COATED ORAL DAILY
Qty: 180 TABLET | Refills: 1 | Status: SHIPPED | OUTPATIENT
Start: 2023-10-03 | End: 2023-12-18 | Stop reason: SDUPTHER

## 2023-10-23 ENCOUNTER — OFFICE VISIT (OUTPATIENT)
Dept: OBSTETRICS AND GYNECOLOGY | Facility: CLINIC | Age: 38
End: 2023-10-23
Payer: COMMERCIAL

## 2023-10-23 VITALS
HEART RATE: 75 BPM | DIASTOLIC BLOOD PRESSURE: 68 MMHG | SYSTOLIC BLOOD PRESSURE: 100 MMHG | WEIGHT: 174 LBS | HEIGHT: 64 IN | BODY MASS INDEX: 29.71 KG/M2

## 2023-10-23 DIAGNOSIS — E89.41 SURGICAL MENOPAUSE, SYMPTOMATIC: Primary | ICD-10-CM

## 2023-10-23 PROCEDURE — 3008F PR BODY MASS INDEX (BMI) DOCUMENTED: ICD-10-PCS | Mod: CPTII,S$GLB,, | Performed by: NURSE PRACTITIONER

## 2023-10-23 PROCEDURE — 1159F PR MEDICATION LIST DOCUMENTED IN MEDICAL RECORD: ICD-10-PCS | Mod: CPTII,S$GLB,, | Performed by: NURSE PRACTITIONER

## 2023-10-23 PROCEDURE — 99204 PR OFFICE/OUTPT VISIT, NEW, LEVL IV, 45-59 MIN: ICD-10-PCS | Mod: S$GLB,,, | Performed by: NURSE PRACTITIONER

## 2023-10-23 PROCEDURE — 99999 PR PBB SHADOW E&M-EST. PATIENT-LVL III: CPT | Mod: PBBFAC,,, | Performed by: NURSE PRACTITIONER

## 2023-10-23 PROCEDURE — 3078F PR MOST RECENT DIASTOLIC BLOOD PRESSURE < 80 MM HG: ICD-10-PCS | Mod: CPTII,S$GLB,, | Performed by: NURSE PRACTITIONER

## 2023-10-23 PROCEDURE — 99999 PR PBB SHADOW E&M-EST. PATIENT-LVL III: ICD-10-PCS | Mod: PBBFAC,,, | Performed by: NURSE PRACTITIONER

## 2023-10-23 PROCEDURE — 3074F SYST BP LT 130 MM HG: CPT | Mod: CPTII,S$GLB,, | Performed by: NURSE PRACTITIONER

## 2023-10-23 PROCEDURE — 3074F PR MOST RECENT SYSTOLIC BLOOD PRESSURE < 130 MM HG: ICD-10-PCS | Mod: CPTII,S$GLB,, | Performed by: NURSE PRACTITIONER

## 2023-10-23 PROCEDURE — 3008F BODY MASS INDEX DOCD: CPT | Mod: CPTII,S$GLB,, | Performed by: NURSE PRACTITIONER

## 2023-10-23 PROCEDURE — 1159F MED LIST DOCD IN RCRD: CPT | Mod: CPTII,S$GLB,, | Performed by: NURSE PRACTITIONER

## 2023-10-23 PROCEDURE — 99204 OFFICE O/P NEW MOD 45 MIN: CPT | Mod: S$GLB,,, | Performed by: NURSE PRACTITIONER

## 2023-10-23 PROCEDURE — 3078F DIAST BP <80 MM HG: CPT | Mod: CPTII,S$GLB,, | Performed by: NURSE PRACTITIONER

## 2023-10-23 RX ORDER — ACETAMINOPHEN 500 MG
500 TABLET ORAL EVERY 6 HOURS PRN
COMMUNITY

## 2023-10-23 RX ORDER — DIPHENHYDRAMINE HYDROCHLORIDE 12.5 MG/1
12.5 BAR, CHEWABLE ORAL 4 TIMES DAILY PRN
COMMUNITY

## 2023-10-23 RX ORDER — ESTRADIOL 0.1 MG/D
PATCH, EXTENDED RELEASE TRANSDERMAL
COMMUNITY
Start: 2023-10-14

## 2023-10-23 RX ORDER — IBUPROFEN 800 MG/1
800 TABLET ORAL 3 TIMES DAILY
COMMUNITY
Start: 2023-07-11 | End: 2024-01-02

## 2023-10-23 NOTE — PROGRESS NOTES
Subjective:      Nimo Miller is a 38 y.o. female who presents to discuss hormone replacement therapy. Recent TLH-BSO in July 2023 with Dr. Jessica Douglass for treatment of PMDD. Severe and persistent PMDD that began following the delivery of her second child. Mild PMDD symptoms prior that were well controlled with use of OCP. She did note an increase the mood symptoms following miscarriage.     Improved stability of mood since hysterectomy. Current use of Vivelle Dot 0.1 mg/d TD twice weekly, started current dosing in the past week. Reporting hot flashes, crying spells, anxiety/depression, headaches, irritability, poor memory, palpitations, and well as significant pain.    Recent Vivelle dot increase in the past week was due to significant lower abdominal cramping and pain, reports pain improved significantly with dose increase. Also notes profound joint pain with radiation of pain to skin of arms. No significant issue of libido, some vaginal dryness but has improved with use of Jerrod Glide.    Follow anti-inflammatory diet, use of Vitamin D supplements. Licensed counselor. She is established with psychiatrist, current use of Zoloft 100 mg PO QD.     Use of Progesterone only birth control and Depo Provera in the past with increase to depressive symptoms.       Pap smear: 6/23/2022  Mammogram: 2023    No visits with results within 3 Month(s) from this visit.   Latest known visit with results is:   Lab Visit on 11/09/2022   Component Date Value Ref Range Status    IgG 11/09/2022 1140  650 - 1600 mg/dL Final    IgA 11/09/2022 203  40 - 350 mg/dL Final    IgM 11/09/2022 146  50 - 300 mg/dL Final    Cholesterol 11/09/2022 214 (H)  120 - 199 mg/dL Final    Triglycerides 11/09/2022 149  30 - 150 mg/dL Final    HDL 11/09/2022 54  40 - 75 mg/dL Final    LDL Cholesterol 11/09/2022 130.2  63.0 - 159.0 mg/dL Final    HDL/Cholesterol Ratio 11/09/2022 25.2  20.0 - 50.0 % Final    Total Cholesterol/HDL Ratio 11/09/2022 4.0   "2.0 - 5.0 Final    Non-HDL Cholesterol 2022 160  mg/dL Final       Past Medical History:   Diagnosis Date    Abnormal Pap smear of cervix     Asthma      Past Surgical History:   Procedure Laterality Date    COLPOSCOPY      HYSTERECTOMY      none      OOPHORECTOMY       Social History     Tobacco Use    Smoking status: Never    Smokeless tobacco: Never   Substance Use Topics    Alcohol use: Yes    Drug use: Yes     Types: Marijuana     Family History   Problem Relation Age of Onset    Breast cancer Maternal Grandmother     No Known Problems Father     No Known Problems Mother     Breast cancer Other     Breast cancer Other     Ovarian cancer Neg Hx     Uterine cancer Neg Hx     Cervical cancer Neg Hx     Colon cancer Neg Hx     Cancer Neg Hx      OB History    Para Term  AB Living   3 2     1     SAB IAB Ectopic Multiple Live Births   1              # Outcome Date GA Lbr Sarmad/2nd Weight Sex Delivery Anes PTL Lv   3 SAB            2 Para      Vag-Spont      1 Para      Vag-Spont          Current Outpatient Medications:     acetaminophen (TYLENOL) 500 MG tablet, Take 500 mg by mouth every 6 (six) hours as needed., Disp: , Rfl:     diphenhydrAMINE (BENADRYL) 12.5 mg chewable tablet, Take 12.5 mg by mouth 4 (four) times daily as needed., Disp: , Rfl:     HEDY 0.1 mg/24 hr PTSW, Place onto the skin., Disp: , Rfl:     ibuprofen (ADVIL,MOTRIN) 800 MG tablet, Take 800 mg by mouth 3 (three) times daily., Disp: , Rfl:     multivitamin capsule, Take 1 capsule by mouth once daily., Disp: , Rfl:     sertraline (ZOLOFT) 100 MG tablet, Take 2 tablets (200 mg total) by mouth once daily., Disp: 180 tablet, Rfl: 1    Vitals:    10/23/23 1536   BP: 100/68   Pulse: 75   Weight: 78.9 kg (174 lb)   Height: 5' 4" (1.626 m)   PainSc:   2     Body mass index is 29.87 kg/m².    Assessment:      Surgical menopause, symptomatic  -     FOLLICLE STIMULATING HORMONE; Future; Expected date: 10/23/2023  -     ESTRADIOL; Future; " Expected date: 10/23/2023  -     PROGESTERONE; Future; Expected date: 10/23/2023  -     TSH; Future; Expected date: 10/23/2023        Plan:     Risks and benefits of hormone replacement therapy were discussed.  Hormone replacement therapy options, including bioidentical versus non-bioidentical hormones, as well as alternatives discussed.    With recent increase to Vivelle Dot dosage, will perform HRT lab work in 2 weeks.     She will FU with Dr. Flores for further assessment of ongoing and significant issues.       ANDRADE Guy-C

## 2023-11-08 ENCOUNTER — TELEPHONE (OUTPATIENT)
Dept: OBSTETRICS AND GYNECOLOGY | Facility: CLINIC | Age: 38
End: 2023-11-08
Payer: COMMERCIAL

## 2023-11-08 NOTE — TELEPHONE ENCOUNTER
----- Message from Ginny Ferrara NP sent at 11/8/2023  1:19 PM CST -----  Regarding: RE: self  In next 3-4 weeks if possible.   ----- Message -----  From: Belkis Lyons MA  Sent: 11/8/2023   1:16 PM CST  To: Ginny Ferrara NP  Subject: RE: self                                         Janelle Gross  I am sorry how soon does she need a HRT follow up?  ----- Message -----  From: Ginny Ferrara NP  Sent: 11/8/2023  10:48 AM CST  To: Sandra ARMAS Staff  Subject: FW: self                                         Please assist to schedule with Dr. Flores for HRT FU visit   ----- Message -----  From: Shahnaz Lopez MA  Sent: 11/8/2023   9:56 AM CST  To: Ginny Ferrara NP  Subject: FW: self                                           ----- Message -----  From: Shahnaz Lopez MA  Sent: 11/7/2023   4:20 PM CST  To: Shahnaz Figueroa MA  Subject: FW: self                                           ----- Message -----  From: Jose Ch  Sent: 11/7/2023   3:30 PM CST  To: Alem Gross Staff  Subject: self                                             Type: Patient Call Back       Who called: self        What is the request in detail: pt steated that she have done her questionnaire since oct 11,2023 and wants to know when can she be schedule for her appt she stated this is her health and the system is failing her        Can the clinic reply by [facility]? Yes       Would the patient rather a call back or a response via [facility]? Call back       Best call back number: [phone]

## 2023-11-09 ENCOUNTER — TELEPHONE (OUTPATIENT)
Dept: OBSTETRICS AND GYNECOLOGY | Facility: CLINIC | Age: 38
End: 2023-11-09
Payer: COMMERCIAL

## 2023-11-09 NOTE — TELEPHONE ENCOUNTER
----- Message from Rachel Avendaño sent at 11/9/2023 11:53 AM CST -----  Who Called:ZAKI PARRA [[id number]]              Who Left Message for Patient:Belkis Lyons              Does the patient know what this is regarding? Not sure              Best Call Back Number:[phone]              Additional Information: Just returning call.Please call back to further assist.

## 2023-11-09 NOTE — TELEPHONE ENCOUNTER
----- Message from Ginny Ferrara NP sent at 11/9/2023  9:23 AM CST -----  Regarding: FW: self  Belkis,    I have received 2 calls this morning regarding desire for appointment.    Ginny  ----- Message -----  From: Shahnaz Lopez MA  Sent: 11/9/2023   9:07 AM CST  To: Ginny Ferrara NP  Subject: FW: self                                         Pt submitted the questionnaire and is requesting some one call her for a hrt appt   ----- Message -----  From: Shahnaz Lopez MA  Sent: 11/7/2023   4:20 PM CST  To: Shahnaz Figueroa MA  Subject: FW: self                                           ----- Message -----  From: Jose Ch  Sent: 11/7/2023   3:30 PM CST  To: Alem Gross Staff  Subject: self                                             Type: Patient Call Back       Who called: self        What is the request in detail: pt steated that she have done her questionnaire since oct 11,2023 and wants to know when can she be schedule for her appt she stated this is her health and the system is failing her        Can the clinic reply by [facility]? Yes       Would the patient rather a call back or a response via [facility]? Call back       Best call back number: [phone]

## 2023-11-13 ENCOUNTER — LAB VISIT (OUTPATIENT)
Dept: LAB | Facility: OTHER | Age: 38
End: 2023-11-13
Attending: NURSE PRACTITIONER
Payer: COMMERCIAL

## 2023-11-13 DIAGNOSIS — E89.41 SURGICAL MENOPAUSE, SYMPTOMATIC: ICD-10-CM

## 2023-11-13 LAB
ESTRADIOL SERPL-MCNC: 86 PG/ML
FSH SERPL-ACNC: 32.46 MIU/ML
PROGEST SERPL-MCNC: 0.5 NG/ML
TSH SERPL DL<=0.005 MIU/L-ACNC: 2.05 UIU/ML (ref 0.4–4)

## 2023-11-13 PROCEDURE — 84144 ASSAY OF PROGESTERONE: CPT | Performed by: NURSE PRACTITIONER

## 2023-11-13 PROCEDURE — 84443 ASSAY THYROID STIM HORMONE: CPT | Performed by: NURSE PRACTITIONER

## 2023-11-13 PROCEDURE — 36415 COLL VENOUS BLD VENIPUNCTURE: CPT | Performed by: NURSE PRACTITIONER

## 2023-11-13 PROCEDURE — 82670 ASSAY OF TOTAL ESTRADIOL: CPT | Performed by: NURSE PRACTITIONER

## 2023-11-13 PROCEDURE — 83001 ASSAY OF GONADOTROPIN (FSH): CPT | Performed by: NURSE PRACTITIONER

## 2023-12-04 ENCOUNTER — OFFICE VISIT (OUTPATIENT)
Dept: OBSTETRICS AND GYNECOLOGY | Facility: CLINIC | Age: 38
End: 2023-12-04
Attending: OBSTETRICS & GYNECOLOGY
Payer: COMMERCIAL

## 2023-12-04 ENCOUNTER — LAB VISIT (OUTPATIENT)
Dept: LAB | Facility: OTHER | Age: 38
End: 2023-12-04
Attending: OBSTETRICS & GYNECOLOGY
Payer: COMMERCIAL

## 2023-12-04 VITALS
BODY MASS INDEX: 29.02 KG/M2 | WEIGHT: 170 LBS | SYSTOLIC BLOOD PRESSURE: 103 MMHG | DIASTOLIC BLOOD PRESSURE: 70 MMHG | HEART RATE: 72 BPM | HEIGHT: 64 IN

## 2023-12-04 DIAGNOSIS — M25.50 ARTHRALGIA, UNSPECIFIED JOINT: ICD-10-CM

## 2023-12-04 DIAGNOSIS — Z78.0 MENOPAUSE: Primary | ICD-10-CM

## 2023-12-04 DIAGNOSIS — Z78.0 MENOPAUSE: ICD-10-CM

## 2023-12-04 LAB
ALBUMIN SERPL BCP-MCNC: 4 G/DL (ref 3.5–5.2)
TESTOST SERPL-MCNC: 14 NG/DL (ref 5–73)

## 2023-12-04 PROCEDURE — 99999 PR PBB SHADOW E&M-EST. PATIENT-LVL III: CPT | Mod: PBBFAC,,, | Performed by: OBSTETRICS & GYNECOLOGY

## 2023-12-04 PROCEDURE — 3074F SYST BP LT 130 MM HG: CPT | Mod: CPTII,S$GLB,, | Performed by: OBSTETRICS & GYNECOLOGY

## 2023-12-04 PROCEDURE — 84402 ASSAY OF FREE TESTOSTERONE: CPT | Performed by: OBSTETRICS & GYNECOLOGY

## 2023-12-04 PROCEDURE — 84403 ASSAY OF TOTAL TESTOSTERONE: CPT | Performed by: OBSTETRICS & GYNECOLOGY

## 2023-12-04 PROCEDURE — 3074F PR MOST RECENT SYSTOLIC BLOOD PRESSURE < 130 MM HG: ICD-10-PCS | Mod: CPTII,S$GLB,, | Performed by: OBSTETRICS & GYNECOLOGY

## 2023-12-04 PROCEDURE — 84270 ASSAY OF SEX HORMONE GLOBUL: CPT | Performed by: OBSTETRICS & GYNECOLOGY

## 2023-12-04 PROCEDURE — 99214 OFFICE O/P EST MOD 30 MIN: CPT | Mod: S$GLB,,, | Performed by: OBSTETRICS & GYNECOLOGY

## 2023-12-04 PROCEDURE — 99214 PR OFFICE/OUTPT VISIT, EST, LEVL IV, 30-39 MIN: ICD-10-PCS | Mod: S$GLB,,, | Performed by: OBSTETRICS & GYNECOLOGY

## 2023-12-04 PROCEDURE — 1159F MED LIST DOCD IN RCRD: CPT | Mod: CPTII,S$GLB,, | Performed by: OBSTETRICS & GYNECOLOGY

## 2023-12-04 PROCEDURE — 82040 ASSAY OF SERUM ALBUMIN: CPT | Performed by: OBSTETRICS & GYNECOLOGY

## 2023-12-04 PROCEDURE — 3078F DIAST BP <80 MM HG: CPT | Mod: CPTII,S$GLB,, | Performed by: OBSTETRICS & GYNECOLOGY

## 2023-12-04 PROCEDURE — 1159F PR MEDICATION LIST DOCUMENTED IN MEDICAL RECORD: ICD-10-PCS | Mod: CPTII,S$GLB,, | Performed by: OBSTETRICS & GYNECOLOGY

## 2023-12-04 PROCEDURE — 99999 PR PBB SHADOW E&M-EST. PATIENT-LVL III: ICD-10-PCS | Mod: PBBFAC,,, | Performed by: OBSTETRICS & GYNECOLOGY

## 2023-12-04 PROCEDURE — 3008F BODY MASS INDEX DOCD: CPT | Mod: CPTII,S$GLB,, | Performed by: OBSTETRICS & GYNECOLOGY

## 2023-12-04 PROCEDURE — 3078F PR MOST RECENT DIASTOLIC BLOOD PRESSURE < 80 MM HG: ICD-10-PCS | Mod: CPTII,S$GLB,, | Performed by: OBSTETRICS & GYNECOLOGY

## 2023-12-04 PROCEDURE — 3008F PR BODY MASS INDEX (BMI) DOCUMENTED: ICD-10-PCS | Mod: CPTII,S$GLB,, | Performed by: OBSTETRICS & GYNECOLOGY

## 2023-12-05 NOTE — PROGRESS NOTES
SUBJECTIVE:   38 y.o. female   presents today for hormone follow-up.  Patient's last menstrual period was 10/28/2022..  She was treated at [facility] for PMDD with hysterectomy and BSO  She reports that the hormone fluctuations around the time of her cycle.  Were debilitating. .  She has been on Vivelle-Dot patches which she has to change every 3-1/2 days.  She was seen by nurse practitioner 2023 and is here for follow-up to discuss other recommendations  She reports her brain fog is better, energy is better and mood swings are better.  She still is having inflammatory pain and rhinitis since March she reports that she has been sleeping better since her hysterectomy.  She is following an anti-inflammatory diet and anti-inflammatory supplements but still has pain and migraines   Labs2023  FSH 32.4  Estradiol 86  Progesterone 0.5 TSH2.049        Past Medical History:   Diagnosis Date    Abnormal Pap smear of cervix     Asthma      Past Surgical History:   Procedure Laterality Date    COLPOSCOPY      HYSTERECTOMY      none      OOPHORECTOMY       Social History     Socioeconomic History    Marital status:    Tobacco Use    Smoking status: Never    Smokeless tobacco: Never   Substance and Sexual Activity    Alcohol use: Yes    Drug use: Yes     Types: Marijuana    Sexual activity: Yes     Partners: Male     Birth control/protection: See Surgical Hx     Family History   Problem Relation Age of Onset    Breast cancer Maternal Grandmother     No Known Problems Father     No Known Problems Mother     Breast cancer Other     Breast cancer Other     Ovarian cancer Neg Hx     Uterine cancer Neg Hx     Cervical cancer Neg Hx     Colon cancer Neg Hx     Cancer Neg Hx      OB History    Para Term  AB Living   3 2     1     SAB IAB Ectopic Multiple Live Births   1              # Outcome Date GA Lbr Sarmad/2nd Weight Sex Delivery Anes PTL Lv   3 SAB            2 Para      Vag-Spont      1 Para       Vag-Spont              Current Outpatient Medications   Medication Sig Dispense Refill    acetaminophen (TYLENOL) 500 MG tablet Take 500 mg by mouth every 6 (six) hours as needed.      diphenhydrAMINE (BENADRYL) 12.5 mg chewable tablet Take 12.5 mg by mouth 4 (four) times daily as needed.      HEDY 0.1 mg/24 hr PTSW Place onto the skin.      ibuprofen (ADVIL,MOTRIN) 800 MG tablet Take 800 mg by mouth 3 (three) times daily.      multivitamin capsule Take 1 capsule by mouth once daily.      sertraline (ZOLOFT) 100 MG tablet Take 2 tablets (200 mg total) by mouth once daily. 180 tablet 1     No current facility-administered medications for this visit.     Allergies: Tioconazole and Fluoxetine     The 10-year ASCVD risk score (Randy JOY, et al., 2019) is: 0.4%    Values used to calculate the score:      Age: 38 years      Sex: Female      Is Non- : No      Diabetic: No      Tobacco smoker: No      Systolic Blood Pressure: 103 mmHg      Is BP treated: No      HDL Cholesterol: 54 mg/dL      Total Cholesterol: 214 mg/dL      ROS:  Constitutional: no weight loss, weight gain, fever, fatigue  Eyes:  No vision changes, glasses/contacts  ENT/Mouth: No ulcers, sinus problems, ears ringing, headache  Cardiovascular: No inability to lie flat, chest pain, exercise intolerance, swelling, heart palpitations  Respiratory: No wheezing, coughing blood, shortness of breath, or cough  Gastrointestinal: No diarrhea, bloody stool, nausea/vomiting, constipation, gas, hemorrhoids  Genitourinary: No blood in urine, painful urination, urgency of urination, frequency of urination, incomplete emptying, incontinence, abnormal bleeding, painful periods, heavy periods, vaginal discharge, vaginal odor, painful intercourse, sexual problems, bleeding after intercourse.  Musculoskeletal: No muscle weakness, +joint pain  Skin/Breast: No painful breasts, nipple discharge, masses, rash, ulcers  Neurological: No passing out,  seizures, numbness, headache  Endocrine: No diabetes, hypothyroid, hyperthyroid, +hot flashes, hair loss, abnormal hair growth, acne  Psychiatric: No depression, crying  Hematologic: No bruises, bleeding, swollen lymph nodes, anemia.      Physical Exam  deferred    ASSESSMENT:   Menopausal symptoms  Joint pain    PLAN:  Total time 30 minutes.  Face-to-face, review of medical record and arranging follow-up  Counseled her to continue Vivelle-Dot 0.1 mg twice weekly  Can consider testosterone.  Recommend we check testosterone labs today  Can also consider progesterone  Will get free testosterone total testosterone, sex hormone binding globulin and albumin  Once I have results back we will discuss with patient whether we will add testosterone and/or progesterone

## 2023-12-07 LAB
SHBG SERPL-SCNC: 127 NMOL/L
TESTOST FREE SERPL-MCNC: <0.4 PG/ML

## 2023-12-11 ENCOUNTER — PATIENT MESSAGE (OUTPATIENT)
Dept: OBSTETRICS AND GYNECOLOGY | Facility: CLINIC | Age: 38
End: 2023-12-11
Payer: COMMERCIAL

## 2023-12-11 NOTE — TELEPHONE ENCOUNTER
Per Dr Flores recommendations pt should try the progesterone first then can talk about testosterone in the future. Pt will be scheduled for 3 month follow up

## 2023-12-13 ENCOUNTER — PATIENT MESSAGE (OUTPATIENT)
Dept: OBSTETRICS AND GYNECOLOGY | Facility: CLINIC | Age: 38
End: 2023-12-13
Payer: COMMERCIAL

## 2023-12-13 ENCOUNTER — TELEPHONE (OUTPATIENT)
Dept: OBSTETRICS AND GYNECOLOGY | Facility: CLINIC | Age: 38
End: 2023-12-13
Payer: COMMERCIAL

## 2023-12-13 NOTE — TELEPHONE ENCOUNTER
----- Message from Roxanne Flores MD sent at 12/11/2023  5:01 PM CST -----  If she wants testosterone- call in cream to [facility] 2% [facility] nightly  Follow up with labs and virtual with Aby in 3 months

## 2023-12-15 ENCOUNTER — TELEPHONE (OUTPATIENT)
Dept: OBSTETRICS AND GYNECOLOGY | Facility: CLINIC | Age: 38
End: 2023-12-15
Payer: COMMERCIAL

## 2023-12-15 RX ORDER — PROGESTERONE 100 MG/1
100 CAPSULE ORAL DAILY
Qty: 30 CAPSULE | Refills: 11 | Status: SHIPPED | OUTPATIENT
Start: 2023-12-15 | End: 2024-03-26

## 2023-12-15 NOTE — TELEPHONE ENCOUNTER
----- Message from Carole Duncan MA sent at 12/15/2023  4:48 PM CST -----    ----- Message -----  From: Katiana Palm  Sent: 12/15/2023   4:39 PM CST  To: Joie DOWLING Staff    Type: appointment request    Who Called: pt  Would the patient rather a call back or a response via [employer]? call  Best Call Back Number:  [phone]  Additional Information: would like to schedule a appointment

## 2023-12-18 RX ORDER — SERTRALINE HYDROCHLORIDE 100 MG/1
200 TABLET, FILM COATED ORAL DAILY
Qty: 180 TABLET | Refills: 1 | Status: SHIPPED | OUTPATIENT
Start: 2023-12-18

## 2023-12-18 NOTE — TELEPHONE ENCOUNTER
Care Due:                  Date            Visit Type   Department     Provider  --------------------------------------------------------------------------------                                EP -                              PRIMARY      [facility]  Last Visit: 11-      CARE ([facility])   MEDICINE       Deborah Hernadez  Next Visit: None Scheduled  None         None Found                                                            Last  Test          Frequency    Reason                     Performed    Due Date  --------------------------------------------------------------------------------    Office Visit  15 months..  sertraline...............  11- 02-    [facility] Embedded Care Due Messages. Reference number: [id number].   12/18/2023 3:58:27 PM CST

## 2023-12-19 ENCOUNTER — TELEPHONE (OUTPATIENT)
Dept: OBSTETRICS AND GYNECOLOGY | Facility: CLINIC | Age: 38
End: 2023-12-19
Payer: COMMERCIAL

## 2023-12-19 NOTE — TELEPHONE ENCOUNTER
Tried contacting patient to advise of scheduled appointment. Left voicemail notifying of date and time appointment has been scheduled.

## 2024-01-02 ENCOUNTER — OFFICE VISIT (OUTPATIENT)
Dept: OBSTETRICS AND GYNECOLOGY | Facility: CLINIC | Age: 39
End: 2024-01-02
Payer: COMMERCIAL

## 2024-01-02 DIAGNOSIS — R32 URINARY INCONTINENCE, UNSPECIFIED TYPE: ICD-10-CM

## 2024-01-02 DIAGNOSIS — F32.81 PMDD (PREMENSTRUAL DYSPHORIC DISORDER): Primary | ICD-10-CM

## 2024-01-02 PROCEDURE — 99213 OFFICE O/P EST LOW 20 MIN: CPT | Mod: 95,,, | Performed by: NURSE PRACTITIONER

## 2024-01-02 PROCEDURE — 1159F MED LIST DOCD IN RCRD: CPT | Mod: CPTII,95,, | Performed by: NURSE PRACTITIONER

## 2024-01-02 NOTE — PROGRESS NOTES
The patient location is: home   The chief complaint leading to consultation is: PMDD follow up     Visit type: audiovisual    Face to Face time with patient: 30 min   45 minutes of total time spent on the encounter, which includes face to face time and non-face to face time preparing to see the patient (eg, review of tests), Obtaining and/or reviewing separately obtained history, Documenting clinical information in the electronic or other health record, Independently interpreting results (not separately reported) and communicating results to the patient/family/caregiver, or Care coordination (not separately reported).         Each patient to whom he or she provides medical services by telemedicine is:  (1) informed of the relationship between the physician and patient and the respective role of any other health care provider with respect to management of the patient; and (2) notified that he or she may decline to receive medical services by telemedicine and may withdraw from such care at any time.    Notes:     Notes:   HPI: Pt is a 38 y.o.  female who presents for telemedicine visit for PMDD consult.  She is s/p hysterectomy and bso 2/2 symptomatic PMDD in 7/23.  She is now on HRT and is currently on estrogen patch changes twice weekly and oral progesterone.   She reports she is still experiencing fatigue, joint and skin inflammation and severe bloating after eating certain foods, gas discomfort and migraines. She will still have some mood fluctuations as well. Reports she had noticed the PMDD since menarche.   Reports h/o childhood emotional trauma. She is taking Zoloft 200 mg daily.  She denies any SI/HI. She has had some intrusive thoughts.   Reports these symptoms are still effecting her quality of life and she is hoping to have moer symptom free days. She lives with her family -  and 2 children ages 4 and 8.  She works as mental health therapist.  She exercises regularly with cardio and some core and  stretching.  Reports overall eats well. She is hoping to reach a better level of functioning now that she has had the surgery. She reports urinary leakage since childbirth. She is interested in doing pelvic floor PT.         ROS:  GENERAL: Feeling well overall. Denies fever or chills.   SKIN: Denies rash or lesions.   HEAD: Denies head injury or headache.   NODES: Denies enlarged lymph nodes.   CHEST: Denies chest pain or shortness of breath.   CARDIOVASCULAR: Denies palpitations or left sided chest pain.   ABDOMEN: No abdominal pain, constipation, diarrhea, nausea, vomiting or rectal bleeding.   URINARY: No dysuria, hematuria, or burning on urination.  REPRODUCTIVE: See HPI.   BREASTS: Denies pain, lumps, or nipple discharge.   HEMATOLOGIC: No easy bruisability or excessive bleeding.   MUSCULOSKELETAL: Denies joint pain or swelling.   NEUROLOGIC: Denies syncope or weakness.   PSYCHIATRIC: + depression, anxiety or mood swings.     PE:   APPEARANCE: Well nourished, well developed, female in no acute distress.  PELVIS: Deferred        Diagnosis:  1. PMDD (premenstrual dysphoric disorder)    2. Urinary incontinence, unspecified type        Plan:     Orders Placed This Encounter    Ambulatory referral/consult to Physical/Occupational Therapy       Discussed to keep calendar to track symptoms to determine if there is still acyclic component to her symptoms  Continue current HRT regimen and can consult Dr. Amanda Yoo if need to adjust dosage   Resources sent for symptom tracker- discussed Me vs PMDD ap and [email or web address] for additional resources and support.   Pt sent given DRSP (Daily Record of Severity of Problems) to screen for PMDD  Discussed tapping technique (alternative acupressure therapy treatment) to help manage symptoms   Discussed importance of tacking her symptoms   Discussed lifestyle modifications /importance of regular exercise, good nutrition, positive support system  Discussed daily purposeful  walking and doing resistance training 3-5 days per week   Discussed importance of reaching out and not isolating when symptomatic and can plan to schedule her talk therapy sessions accordingly if she determines there is still a cyclic component to her symptoms   Discussed if suicidal or homicidal ideation occurs to reach out for help/ go to there ER.  Pt verbalized understanding.  referral to pelvic floor pt   Follow-up with me in 6 months or PRN        Cheri Salter, [email or web address]

## 2024-02-14 ENCOUNTER — CLINICAL SUPPORT (OUTPATIENT)
Dept: REHABILITATION | Facility: HOSPITAL | Age: 39
End: 2024-02-14
Payer: COMMERCIAL

## 2024-02-14 DIAGNOSIS — M62.89 PELVIC FLOOR DYSFUNCTION: ICD-10-CM

## 2024-02-14 DIAGNOSIS — N81.89 WEAKNESS OF PELVIC FLOOR: ICD-10-CM

## 2024-02-14 DIAGNOSIS — R32 URINARY INCONTINENCE, UNSPECIFIED TYPE: ICD-10-CM

## 2024-02-14 PROCEDURE — 97161 PT EVAL LOW COMPLEX 20 MIN: CPT | Mod: PO

## 2024-02-14 NOTE — PATIENT INSTRUCTIONS
Home Exercise Program: 02/14/2024    Kegels:    Quick Flicks   Perform, a fast kegel (contract and LIFT the pelvic floor muscles as if you're trying to stop the stream of urine and passage of gas).    Make sure you're just using the internal muscles, not holding for longer than 1 second without holding your breath.  Let go and relax everything for 3-5 seconds.   Repeat 10 times, 3 sets per day.     Endurance Holds  Perform a long kegel (contract and LIFT the pelvic floor muscles as if you're trying to stop the stream of urine and passage of gas).    Make sure you're just using the internal muscles without holding your breath. Hold 4-5 seconds.   Let go and relax everything for 4-5 seconds.   Repeat 10 times, 3 sets per day.

## 2024-02-14 NOTE — PLAN OF CARE
[facility]  Pelvic Health Physical Therapy Initial Evaluation    Date: 2/14/2024   Name: Nimo Barrera [facility] Number: [id number]  Therapy Diagnosis:   Encounter Diagnoses   Name Primary?    Urinary incontinence, unspecified type     Weakness of pelvic floor     Pelvic floor dysfunction      Physician: Cheri Salter,*    Physician Orders: Pelvic PT Eval and Treat  Medical Diagnosis from Referral: Urinary incontinence, unspecified type [R32]   Evaluation Date: 2/14/2024  Authorization Period Expiration: TBD  Plan of Care Expiration: 06/05/24  Visit # / Visits authorized: 1/ 1  FOTO: 1/3    Time In: 2:00  Time Out: 2:45  Total Appointment Time (timed & untimed codes): 45 minutes    Precautions: universal    Subjective     Date of onset: 8 years ago with the birth of her first son    History of current condition - Nimo Barrera reports: Ever since having her babies she has had urinary leakage. About 3 years ago she became ill with severe PMDD, so she had a hysterectomy in July. Urinary issues seemed to improve when she was taking estrogen only. Though she is now on testosterone and progesterone, and has noticed worsening incontinence again. She drinks a lot of water - between 9 to 12 cups on an average day. She is trying to go at least two hours between bathroom trips. 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    OB/GYN History:  has two boys ages 4 and 8, vaginal deliveries for both. Total hysterectomy in July 2023.  Sexually active? Yes  Pain with vaginal exams, intercourse or tampon use? Uses lubricant, some tenderness due to the inflammation.     Bladder/Bowel History:   Frequency of urination:   Daytime: every 2 hours            Nighttime: once per night,  twice if she has a bad night  Difficulty initiating urine stream: No  Urine stream: strong  Complete emptying: Yes  Bladder leakage: Yes  Frequency of incidents: daily. With coughing, laughing, sneezing, yelling,  "occasionally with burlesque dancing  Amount leaked (urine): small squirt   Urinary Urgency: Yes  Able to delay the urge for at least a few minute(s). Sometimes will have really strong urge if she's waited a while  Frequency of bowel movements: 1-2 times per day  Difficulty initiating BM: No  Quality/Shape of BM: usually normal. Fluctuating diarrhea and constipation due to HRT cycling currently   Does Patient Feel Empty after BM? Yes  Fiber Supplements or Laxative Use?  No  Colon leakage: No    Prior Therapy/Previous treatment included: none  Social History: lives with their family  Current exercise:Burlesque dancing. 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  Prior Level of Function: independent  Current Level of Function: independent    Types of fluid intake: 9-12 cups of water. Smoothie in the morning  Diet: Traditional   Habitus:well developed, well nourished  Abuse/Neglect: No     PAIN:  Location: denies pain in the pelvis     Medical History: Nimo Barrera  has a past medical history of Abnormal Pap smear of cervix and Asthma.     Surgical History:  Nimo Miller  has a past surgical history that includes none; Colposcopy; Hysterectomy (Bilateral, 07/2023); and Oophorectomy.    Medications: Nimo has a current medication list which includes the following prescription(s): acetaminophen, diphenhydramine, ivan, progesterone, sertraline, and UNABLE TO FIND.    Allergies:   Review of patient's allergies indicates:   Allergen Reactions    Tioconazole Itching and Swelling    Fluoxetine Other (See Comments)     Chills, SOB, Fatigue, Dizziness, Joint and Muscle aches.  "full like symptoms; serotonin syndrome"  Chills, SOB, Fatigue, Dizziness, Joint and Muscle aches.    Joint pain, dizziness, fatigue          Imaging See EMR for full imaging workup    Pts goals: to reduce urinary leaks and strengthen " her pelvic floor muscles     OBJECTIVE     See EMR under MEDIA for written consent provided 2/14/2024  Chaperone: none    ORTHO SCREEN  Posture in sitting: WNL  Posture in standing: WNL  Pelvic alignment: Not assessed today     BREATHING MECHANICS ASSESSMENT   Thorax Assessment During Quiet Respiration: WNL excursion of ribcage and WNL excursion of abdominal wall  Thorax Assessment During Deep Respiration: WNL excursion of ribcage and WNL excursion of abdominal wall    VAGINAL PELVIC FLOOR EXAM    EXTERNAL ASSESSMENT  Introitus: WNL  Skin condition: WNL  Scarring: none   Sensation: WNL   Pain:  none  Voluntary contraction: visible lift  Voluntary relaxation: visible drop  Involuntary contraction: nil  Bearing down: bulge  Perineal descent: absent  Comments:        INTERNAL ASSESSMENT  Pain: none   Sensation: WNL  Vaginal vault: WNL   Muscle Bulk: WFL   Muscle Power: 2/5  Muscle Endurance: 10 sec  # Reps To Fatigue: not tested    Fast Contractions in 10 seconds: not tested     Quality of contraction: WNL   Specificity: WNL   Coordination: WNL   Comments:       Limitation/Restriction for FOTO          TREATMENT     Treatment Time In: 2:40  Treatment Time Out: 2:45  Total Treatment time (time-based codes) separate from Evaluation: 5 minutes    Therapeutic Activity Patient participated in dynamic functional therapeutic activities to improve functional performance for 5 minutes. Including: Education as described below.     Patient Education provided:   general anatomy/physiology of urinary/ bowel  system, benefits of treatment, risks of treatment, and alternative methods of treatment were discussed with the pt. Additionally, anatomy/physiology of pelvic floor and kegels were reviewed.     Home Exercises Provided:  Written Home Exercises Provided: yes.  Exercises were reviewed and Gail was able to demonstrate them prior to the end of the session.    Nimo Barrera demonstrated good  understanding of the education  provided.     See EMR under Patient Instructions for exercises provided 2/14/2024.    Assessment     Nimo is a 39 y.o. female referred to outpatient Physical Therapy with a medical diagnosis of Urinary incontinence, unspecified type [R32] . Pt presents with decreased pelvic muscle strength, poor quality of pelvic muscle contraction, and poor coordination of pelvic floor muscles during ADL's leading to urinary or fecal leakage.     Pt prognosis is Good.   Pt will benefit from skilled outpatient Physical Therapy to address the deficits stated above and in the chart below, provide pt/family education, and to maximize pt's level of independence.     Plan of care discussed with patient: Yes  Pt's spiritual, cultural and educational needs considered and patient is agreeable to the plan of care and goals as stated below:       Anticipated Barriers for therapy: scheduling,    Medical Necessity is demonstrated by the following    History  Co-morbidities and personal factors that may impact the plan of care Co-morbidities:   prior pelvic surgery    Personal Factors:   no deficits     low   Examination  Body Structures and Functions, activity limitations and participation restrictions that may impact the plan of care Body Regions/Systems/Functions:  decreased pelvic muscle strength, poor quality of pelvic muscle contraction, and poor coordination of pelvic floor muscles during ADL's leading to urinary or fecal leakage.      Activity Limitations:  urgency  and incontinence with ADLs    Participation Restrictions:  social activities with friends/family, work duties, Sleep restrictions, and exercise restrictions due to incontinence     Activity limitations:   Learning and applying knowledge  no deficits    General Tasks and Commands  no deficits    Communication  no deficits    Mobility  no deficits    Self care  no deficits    Domestic Life  no deficits    Interactions/Relationships  no deficits    Life Areas  no  deficits    Community and Social Life  no deficits       low   Clinical Presentation stable and uncomplicated low   Decision Making/ Complexity Score: low     Short Term Goals: 6 weeks  Pt will verbalize improved awareness of PFM activity as palpated by PT in order to improve activity involvement with HEP.  Pt to be edu pelvic muscle bracing and be able to consistently perform correctly and quickly to help decrease incontinence with cough/laugh/sneeze.  Pt to demonstrate being able to correctly and consistently perform a kegel which is needed  to increase pelvic floor muscle coordination and strength needed for continence.  Pt to be able to delay the urge to urinate at least 5 minutes with a strong urge to urinate in order to make it to the bathroom without leaking.  Pt to demonstrate an improved score in the FOTO urinary problem survey  to at least  53 to demonstrate improving bladder control.        Long Term Goals: 16 weeks  Pt will report improved ability to sleep uninterrupted by excessive nocturia 5/7 days per week.   Pt will report a decrease in pad use to 0 pads per day.  Pt to be discharged with home plan for carry over after discharge.    Pt to be able to perform a 10 second kegel x 10 reps to demonstrate improving strength and endurance needed for continence.  Pt to be able to delay the urge to urinate at least 10 minutes with a strong urge to urinate in order to make it to the bathroom without leaking.  Pt to report no longer feeling the need to urinate just in case when shopping or participating in social activities to demonstrate improving pelvic floor and bladder control.  Pt to report elimination of incontinence with ADLs to demonstrate improved pelvic floor muscle strength and coordination  Pt to demonstrate an improved score in the FOTO urinary problem  survey  to at least 59 to demonstrate improving bladder control.    Pt to increase pelvic floor strength to at least 3/5 to demonstrate improved  strength needed for continence with ADLs.       Plan     Plan of care Certification: 2/14/2024 to 06/05/24.    Outpatient Physical Therapy 0-2 times weekly for 16 weeks to include the following interventions: therapeutic exercises, therapeutic activity, neuromuscular re-education, manual therapy, modalities PRN, patient/family education, dry needling, and self care/home management    I appreciate your consult and look forward to participating in this patient's care.    Johanne Bowman, PT, DPT

## 2024-03-07 ENCOUNTER — CLINICAL SUPPORT (OUTPATIENT)
Dept: REHABILITATION | Facility: HOSPITAL | Age: 39
End: 2024-03-07
Payer: COMMERCIAL

## 2024-03-07 DIAGNOSIS — R32 URINARY INCONTINENCE, UNSPECIFIED TYPE: Primary | ICD-10-CM

## 2024-03-07 DIAGNOSIS — N81.89 WEAKNESS OF PELVIC FLOOR: ICD-10-CM

## 2024-03-07 DIAGNOSIS — M62.89 PELVIC FLOOR DYSFUNCTION: ICD-10-CM

## 2024-03-07 PROCEDURE — 97112 NEUROMUSCULAR REEDUCATION: CPT | Mod: PO

## 2024-03-07 NOTE — PATIENT INSTRUCTIONS
3 sets of reps to fatigue bridge lifts with a) ball squeeze and b) knees out    3 sets of reps to fatigue: half-bridge hold with alternating pelvic rotation with a) ball squeeze       9

## 2024-03-07 NOTE — PROGRESS NOTES
Pelvic Health Physical Therapy   Treatment Note     Name: Nimo Barrera [facility] Number: [id number]    Therapy Diagnosis:   Encounter Diagnoses   Name Primary?    Urinary incontinence, unspecified type Yes    Weakness of pelvic floor     Pelvic floor dysfunction      Physician: Cheri Salter,*    Visit Date: 3/7/2024    Physician Orders: Pelvic PT Eval and Treat  Medical Diagnosis from Referral: Urinary incontinence, unspecified type [R32]   Evaluation Date: 2/14/2024  Authorization Period Expiration: 12/31/24  Plan of Care Expiration: 06/05/24  Visit # / Visits authorized: 1/20  FOTO: 1/3  Cancelled Visits: 0  No Show Visits: 0    Time In: 10:00  Time Out: 10:45  Total Billable Time: 45 minutes    Precautions: Standard    Subjective     Pt reports: She is exhausted. She is about the same with urinary issues. Having diarrhea, nausea, low appetite.  She was compliant with home exercise program.  Response to previous treatment/ Functional change: ongoing    Pain: 0/10  Location: -    Constitutional Symptoms Review: Nausea and Diarrhea     Objective   Pt verbally consents to treatment today.  Signed consent form already on file.     Nimo Barrera received therapeutic exercises to develop  strength, endurance, ROM, flexibility, posture, and core stabilization for 00 minutes including: see chart below    Nimo Barrera received the following manual therapy techniques: to develop flexibility, extensibility, and desensitization for 00 minutes including: see chart below    Nimo Barrera participated in neuromuscular re-education activities to develop Coordination, Control, Down training, Posture, Proprioception, Kinesthetic, Balance, and Sense for 40 minutes including: see chart below    Nimo Barrera participated in dynamic functional therapeutic activities to improve functional performance for 00  minutes, including: see chart below      Type Intervention 3/8/24   NMR PelviCore ball/band - 3 sets to fatigue x   NMR PelviCore  ball bridge - 3 sets to fatigue x   NMR PelviCore band bridge- 3 sets to fatigue x   NMR PelviCore ball bridge hip dips - 3 sets to fatigue x   NMR PelviCore band bridge hip dips - 3 sets to fatigue x                           Home Exercises Provided and Patient Education Provided     Education provided:   - exercises above  Discussed progression of plan of care with patient; educated pt in activity modification; reviewed HEP with pt. Pt demonstrated and verbalized understanding of all instruction and was provided with a handout of HEP (see Patient Instructions).    Written Home Exercises Provided: yes.  Exercises were reviewed and Nimo Barrera was able to demonstrate them prior to the end of the session.  Nimo Barrera demonstrated good  understanding of the education provided.     See EMR under Patient Instructions for exercises provided 3/7/2024.    Assessment     Nimo Barrera tolerated treatment well and demonstrated understanding of all education provided at today's visit. Low levels of energy today - worked to point of mild muscle fatigue.  Nimo Barrera Is progressing well towards her goals.   Pt prognosis is Good.     Pt will continue to benefit from skilled outpatient physical therapy to address the deficits listed in the problem list box on initial evaluation, provide pt/family education and to maximize pt's level of independence in the home and community environment.     Pt's spiritual, cultural and educational needs considered and pt agreeable to plan of care and goals.     Anticipated barriers to physical therapy: scheduling  Short Term Goals: 6 weeks  Pt will verbalize improved awareness of PFM activity as palpated by PT in order to improve activity involvement with HEP.  Pt to be edu pelvic muscle bracing and be able to consistently perform correctly and quickly to help decrease incontinence with cough/laugh/sneeze.  Pt to demonstrate being able to correctly and consistently perform a kegel which is needed  to  increase pelvic floor muscle coordination and strength needed for continence.  Pt to be able to delay the urge to urinate at least 5 minutes with a strong urge to urinate in order to make it to the bathroom without leaking.  Pt to demonstrate an improved score in the FOTO urinary problem survey  to at least  53 to demonstrate improving bladder control.                  Long Term Goals: 16 weeks  Pt will report improved ability to sleep uninterrupted by excessive nocturia 5/7 days per week.   Pt will report a decrease in pad use to 0 pads per day.  Pt to be discharged with home plan for carry over after discharge.    Pt to be able to perform a 10 second kegel x 10 reps to demonstrate improving strength and endurance needed for continence.  Pt to be able to delay the urge to urinate at least 10 minutes with a strong urge to urinate in order to make it to the bathroom without leaking.  Pt to report no longer feeling the need to urinate just in case when shopping or participating in social activities to demonstrate improving pelvic floor and bladder control.  Pt to report elimination of incontinence with ADLs to demonstrate improved pelvic floor muscle strength and coordination  Pt to demonstrate an improved score in the FOTO urinary problem  survey  to at least 59 to demonstrate improving bladder control.    Pt to increase pelvic floor strength to at least 3/5 to demonstrate improved strength needed for continence with ADLs.        Plan     Continue per established POC.    Johanne Bowman, PT, DPT

## 2024-03-12 ENCOUNTER — CLINICAL SUPPORT (OUTPATIENT)
Dept: REHABILITATION | Facility: HOSPITAL | Age: 39
End: 2024-03-12
Payer: COMMERCIAL

## 2024-03-12 DIAGNOSIS — N81.89 WEAKNESS OF PELVIC FLOOR: ICD-10-CM

## 2024-03-12 DIAGNOSIS — M62.89 PELVIC FLOOR DYSFUNCTION: ICD-10-CM

## 2024-03-12 DIAGNOSIS — R32 URINARY INCONTINENCE, UNSPECIFIED TYPE: Primary | ICD-10-CM

## 2024-03-12 PROCEDURE — 97112 NEUROMUSCULAR REEDUCATION: CPT | Mod: PO

## 2024-03-12 NOTE — PROGRESS NOTES
Pelvic Health Physical Therapy   Treatment Note     Name: Nimo Barrera [facility] Number: [id number]    Therapy Diagnosis:   Encounter Diagnoses   Name Primary?    Urinary incontinence, unspecified type Yes    Weakness of pelvic floor     Pelvic floor dysfunction      Physician: Cheri Salter,*    Visit Date: 3/12/2024    Physician Orders: Pelvic PT Eval and Treat  Medical Diagnosis from Referral: Urinary incontinence, unspecified type [R32]   Evaluation Date: 2/14/2024  Authorization Period Expiration: 12/31/24  Plan of Care Expiration: 06/05/24  Visit # / Visits authorized: 2/20  FOTO: 1/3  Cancelled Visits: 0  No Show Visits: 0    Time In: 9:45  Time Out: 10:30  Total Billable Time: 45 minutes    Precautions: Standard    Subjective     Pt reports: She is feeling a little better than last week.  She was compliant with home exercise program.  Response to previous treatment/ Functional change: ongoing    Pain: 0/10  Location: -    Constitutional Symptoms Review: Nausea and Diarrhea     Objective   Pt verbally consents to treatment today.  Signed consent form already on file.     Nimo Barrera received therapeutic exercises to develop  strength, endurance, ROM, flexibility, posture, and core stabilization for 00 minutes including: see chart below    Nimo Barrera received the following manual therapy techniques: to develop flexibility, extensibility, and desensitization for 00 minutes including: see chart below    Nimo Barrera participated in neuromuscular re-education activities to develop Coordination, Control, Down training, Posture, Proprioception, Kinesthetic, Balance, and Sense for 40 minutes including: see chart below    Nimo Barrera participated in dynamic functional therapeutic activities to improve functional performance for 00  minutes, including: see chart below      Type Intervention 3/8/24 3/12/24   NMR PelviCore ball/band - 3 sets to fatigue x x   NMR PelviCore ball bridge - 3 sets to fatigue x x   NMR  PelviCore band bridge- 3 sets to fatigue x x   NMR PelviCore ball bridge hip dips - 3 sets to fatigue x x   NMR PelviCore band bridge hip dips - 3 sets to fatigue x x   NMR Standing march from stool for core/glute stabilization 2x10  x   NMR Squat + Kegel 1 set to fatigue  x                   Home Exercises Provided and Patient Education Provided     Education provided:   - exercises above  Discussed progression of plan of care with patient; educated pt in activity modification; reviewed HEP with pt. Pt demonstrated and verbalized understanding of all instruction and was provided with a handout of HEP (see Patient Instructions).    Written Home Exercises Provided: yes.  Exercises were reviewed and Nimo Barrera was able to demonstrate them prior to the end of the session.  Nimo Barrera demonstrated good  understanding of the education provided.     See EMR under Patient Instructions for exercises provided 3/7/2024.    Assessment     Nimo Barrera tolerated treatment well and demonstrated understanding of all education provided at today's visit. She demonstrates great technique with all exercises and requires little cueing for proper coordination of pelvic floor muscles.  Nimo Barrera Is progressing well towards her goals.   Pt prognosis is Good.     Pt will continue to benefit from skilled outpatient physical therapy to address the deficits listed in the problem list box on initial evaluation, provide pt/family education and to maximize pt's level of independence in the home and community environment.     Pt's spiritual, cultural and educational needs considered and pt agreeable to plan of care and goals.     Anticipated barriers to physical therapy: scheduling  Short Term Goals: 6 weeks  Pt will verbalize improved awareness of PFM activity as palpated by PT in order to improve activity involvement with HEP.  Pt to be edu pelvic muscle bracing and be able to consistently perform correctly and quickly to help decrease incontinence  with cough/laugh/sneeze.  Pt to demonstrate being able to correctly and consistently perform a kegel which is needed  to increase pelvic floor muscle coordination and strength needed for continence.  Pt to be able to delay the urge to urinate at least 5 minutes with a strong urge to urinate in order to make it to the bathroom without leaking.  Pt to demonstrate an improved score in the FOTO urinary problem survey  to at least  53 to demonstrate improving bladder control.                  Long Term Goals: 16 weeks  Pt will report improved ability to sleep uninterrupted by excessive nocturia 5/7 days per week.   Pt will report a decrease in pad use to 0 pads per day.  Pt to be discharged with home plan for carry over after discharge.    Pt to be able to perform a 10 second kegel x 10 reps to demonstrate improving strength and endurance needed for continence.  Pt to be able to delay the urge to urinate at least 10 minutes with a strong urge to urinate in order to make it to the bathroom without leaking.  Pt to report no longer feeling the need to urinate just in case when shopping or participating in social activities to demonstrate improving pelvic floor and bladder control.  Pt to report elimination of incontinence with ADLs to demonstrate improved pelvic floor muscle strength and coordination  Pt to demonstrate an improved score in the FOTO urinary problem  survey  to at least 59 to demonstrate improving bladder control.    Pt to increase pelvic floor strength to at least 3/5 to demonstrate improved strength needed for continence with ADLs.     Plan     Continue per established POC.    Johanne Bowman, PT, DPT

## 2024-03-14 ENCOUNTER — TELEPHONE (OUTPATIENT)
Dept: OBSTETRICS AND GYNECOLOGY | Facility: CLINIC | Age: 39
End: 2024-03-14
Payer: COMMERCIAL

## 2024-03-14 NOTE — TELEPHONE ENCOUNTER
"----- Message from Imani Linn sent at 3/14/2024  9:30 AM CDT -----  Regarding: Appointment  Access              Name of Who is Calling:  ZAKI PARRA    Who Left The Message:  ZAKI PARRA      What is the request in detail:      Patient called requesting to schedule a new patient's appointment with Dr. CHICA Dallas MD. I did attempt to schedule per request but was unsuccessful.. Patient was previously seen by an other doctor but states, "she didn't like her."  Please further Advise.   Thank you    Reply by [facility]: NO      Preferred Call Back :  [phone] (S)    3/14/24 @ 0151 Returned pt's call regarding a new patient appointment, Pt scheduled on 3/21 with Dr Walter at [facility] at 2:45 location verified. Pt verbalzies understanding.                                           "

## 2024-03-21 ENCOUNTER — OFFICE VISIT (OUTPATIENT)
Dept: OBSTETRICS AND GYNECOLOGY | Facility: CLINIC | Age: 39
End: 2024-03-21
Attending: STUDENT IN AN ORGANIZED HEALTH CARE EDUCATION/TRAINING PROGRAM
Payer: COMMERCIAL

## 2024-03-21 ENCOUNTER — LAB VISIT (OUTPATIENT)
Dept: LAB | Facility: OTHER | Age: 39
End: 2024-03-21
Attending: STUDENT IN AN ORGANIZED HEALTH CARE EDUCATION/TRAINING PROGRAM
Payer: COMMERCIAL

## 2024-03-21 VITALS
SYSTOLIC BLOOD PRESSURE: 110 MMHG | DIASTOLIC BLOOD PRESSURE: 80 MMHG | WEIGHT: 167.88 LBS | HEIGHT: 64 IN | BODY MASS INDEX: 28.66 KG/M2

## 2024-03-21 DIAGNOSIS — F32.81 PMDD (PREMENSTRUAL DYSPHORIC DISORDER): ICD-10-CM

## 2024-03-21 DIAGNOSIS — F41.9 ANXIETY AND DEPRESSION: ICD-10-CM

## 2024-03-21 DIAGNOSIS — F32.81 PMDD (PREMENSTRUAL DYSPHORIC DISORDER): Primary | ICD-10-CM

## 2024-03-21 DIAGNOSIS — F32.A ANXIETY AND DEPRESSION: ICD-10-CM

## 2024-03-21 LAB
ESTRADIOL SERPL-MCNC: 32 PG/ML
FSH SERPL-ACNC: 24.88 MIU/ML
PROGEST SERPL-MCNC: 0.4 NG/ML
TSH SERPL DL<=0.005 MIU/L-ACNC: 1.73 UIU/ML (ref 0.4–4)

## 2024-03-21 PROCEDURE — 84144 ASSAY OF PROGESTERONE: CPT | Performed by: STUDENT IN AN ORGANIZED HEALTH CARE EDUCATION/TRAINING PROGRAM

## 2024-03-21 PROCEDURE — 1159F MED LIST DOCD IN RCRD: CPT | Mod: CPTII,S$GLB,, | Performed by: STUDENT IN AN ORGANIZED HEALTH CARE EDUCATION/TRAINING PROGRAM

## 2024-03-21 PROCEDURE — 83001 ASSAY OF GONADOTROPIN (FSH): CPT | Performed by: STUDENT IN AN ORGANIZED HEALTH CARE EDUCATION/TRAINING PROGRAM

## 2024-03-21 PROCEDURE — 3079F DIAST BP 80-89 MM HG: CPT | Mod: CPTII,S$GLB,, | Performed by: STUDENT IN AN ORGANIZED HEALTH CARE EDUCATION/TRAINING PROGRAM

## 2024-03-21 PROCEDURE — 82670 ASSAY OF TOTAL ESTRADIOL: CPT | Performed by: STUDENT IN AN ORGANIZED HEALTH CARE EDUCATION/TRAINING PROGRAM

## 2024-03-21 PROCEDURE — 84443 ASSAY THYROID STIM HORMONE: CPT | Performed by: STUDENT IN AN ORGANIZED HEALTH CARE EDUCATION/TRAINING PROGRAM

## 2024-03-21 PROCEDURE — 99999 PR PBB SHADOW E&M-EST. PATIENT-LVL III: CPT | Mod: PBBFAC,,, | Performed by: STUDENT IN AN ORGANIZED HEALTH CARE EDUCATION/TRAINING PROGRAM

## 2024-03-21 PROCEDURE — 3074F SYST BP LT 130 MM HG: CPT | Mod: CPTII,S$GLB,, | Performed by: STUDENT IN AN ORGANIZED HEALTH CARE EDUCATION/TRAINING PROGRAM

## 2024-03-21 PROCEDURE — 36415 COLL VENOUS BLD VENIPUNCTURE: CPT | Performed by: STUDENT IN AN ORGANIZED HEALTH CARE EDUCATION/TRAINING PROGRAM

## 2024-03-21 PROCEDURE — 1160F RVW MEDS BY RX/DR IN RCRD: CPT | Mod: CPTII,S$GLB,, | Performed by: STUDENT IN AN ORGANIZED HEALTH CARE EDUCATION/TRAINING PROGRAM

## 2024-03-21 PROCEDURE — 99213 OFFICE O/P EST LOW 20 MIN: CPT | Mod: S$GLB,,, | Performed by: STUDENT IN AN ORGANIZED HEALTH CARE EDUCATION/TRAINING PROGRAM

## 2024-03-21 PROCEDURE — 3008F BODY MASS INDEX DOCD: CPT | Mod: CPTII,S$GLB,, | Performed by: STUDENT IN AN ORGANIZED HEALTH CARE EDUCATION/TRAINING PROGRAM

## 2024-03-25 ENCOUNTER — PATIENT MESSAGE (OUTPATIENT)
Dept: OBSTETRICS AND GYNECOLOGY | Facility: CLINIC | Age: 39
End: 2024-03-25
Payer: COMMERCIAL

## 2024-03-25 DIAGNOSIS — E89.41 SURGICAL MENOPAUSE, SYMPTOMATIC: Primary | ICD-10-CM

## 2024-03-26 RX ORDER — PROGESTERONE 200 MG/1
200 CAPSULE ORAL NIGHTLY
Qty: 30 CAPSULE | Refills: 11 | Status: SHIPPED | OUTPATIENT
Start: 2024-03-26 | End: 2025-03-26

## 2024-03-26 NOTE — PROGRESS NOTES
"Chief Complaint: PMDD management     HPI:      Nimo Miller is a 39 y.o.  who presents today to discuss PMDD management.  Pt with history of PMDD symptoms since menarche. Symptoms significantly worsened in  despite OCP and SSRI. Then had a robotic assisted vaginal hysterectomy/BSO at [facility] in 2023 for persistent PMDD. While symptoms initially improved, she began to have significant symptoms again to a point of decreasing her daily functionality. 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 She [facility] and was started on estradiol patch 0.1 mg/24 hr twice weekly and prometrium 100 mg nightly, which she has been on since January. Also on Zoloft 200 mg with prior Psychiatrist, which she has been taking for many years. She regularly sees a therapist. Currently denies suicidal/homicidal ideation. Interested in new Psychiatrist and assistance with medical management of her symptoms.    Physical Exam:      PHYSICAL EXAM:  /80 (BP Location: Left arm, Patient Position: Sitting, BP Method: Medium (Manual))   Ht 5' 4" (1.626 m)   Wt 76.1 kg (167 lb 14.1 oz)   LMP 10/28/2022   BMI 28.82 kg/m²   Body mass index is 28.82 kg/m².     APPEARANCE: Well nourished, well developed, in no acute distress.    Assessment/Plan:     PMDD (premenstrual dysphoric disorder)  -     TSH; Future; Expected date: 2024  -     ESTRADIOL; Future; Expected date: 2024  -     PROGESTERONE; Future; Expected date: 2024  -     FOLLICLE STIMULATING HORMONE; Future; Expected date: 2024    - recommend repeat hormone labs today   - may adjust hormone dosing pending results  - also recommend Psychiatry consultation to discuss switching SSRI vs adding another anti-depressant/anxiety medication -- she desires new Psychiatrist, will place order for this and look into specialist  - will be in touch with pt regarding next " steps

## 2024-04-04 ENCOUNTER — TELEPHONE (OUTPATIENT)
Dept: OBSTETRICS AND GYNECOLOGY | Facility: CLINIC | Age: 39
End: 2024-04-04
Payer: COMMERCIAL

## 2024-04-04 NOTE — TELEPHONE ENCOUNTER
Called to check in on mood since increasing to the progesterone 200 nightly and discussed if able to get in with Psychiatry. She reports still having significant fatigue and digestive issues, feels mood may be more stable. Just increased last week so will continue to monitor. Also states she was able to get in with an outside Psychiatrist tomorrow! Which is great news, she will send me a portal message with update. No other questions/concerns

## 2024-04-22 ENCOUNTER — LAB VISIT (OUTPATIENT)
Dept: LAB | Facility: HOSPITAL | Age: 39
End: 2024-04-22
Attending: NURSE PRACTITIONER
Payer: COMMERCIAL

## 2024-04-22 ENCOUNTER — OFFICE VISIT (OUTPATIENT)
Dept: FAMILY MEDICINE | Facility: CLINIC | Age: 39
End: 2024-04-22
Payer: COMMERCIAL

## 2024-04-22 VITALS
OXYGEN SATURATION: 97 % | BODY MASS INDEX: 29.2 KG/M2 | WEIGHT: 170.13 LBS | DIASTOLIC BLOOD PRESSURE: 77 MMHG | HEART RATE: 97 BPM | SYSTOLIC BLOOD PRESSURE: 126 MMHG

## 2024-04-22 DIAGNOSIS — R11.0 NAUSEA: ICD-10-CM

## 2024-04-22 DIAGNOSIS — Z00.00 ANNUAL PHYSICAL EXAM: ICD-10-CM

## 2024-04-22 DIAGNOSIS — Z00.00 ANNUAL PHYSICAL EXAM: Primary | ICD-10-CM

## 2024-04-22 DIAGNOSIS — Z13.1 SCREENING FOR DIABETES MELLITUS: ICD-10-CM

## 2024-04-22 LAB
ALBUMIN SERPL BCP-MCNC: 4 G/DL (ref 3.5–5.2)
ALP SERPL-CCNC: 70 U/L (ref 55–135)
ALT SERPL W/O P-5'-P-CCNC: 9 U/L (ref 10–44)
ANION GAP SERPL CALC-SCNC: 10 MMOL/L (ref 8–16)
AST SERPL-CCNC: 18 U/L (ref 10–40)
BASOPHILS # BLD AUTO: 0.07 K/UL (ref 0–0.2)
BASOPHILS NFR BLD: 0.9 % (ref 0–1.9)
BILIRUB SERPL-MCNC: 0.2 MG/DL (ref 0.1–1)
BUN SERPL-MCNC: 16 MG/DL (ref 6–20)
CALCIUM SERPL-MCNC: 9.5 MG/DL (ref 8.7–10.5)
CHLORIDE SERPL-SCNC: 106 MMOL/L (ref 95–110)
CHOLEST SERPL-MCNC: 200 MG/DL (ref 120–199)
CHOLEST/HDLC SERPL: 3.7 {RATIO} (ref 2–5)
CO2 SERPL-SCNC: 25 MMOL/L (ref 23–29)
CREAT SERPL-MCNC: 0.8 MG/DL (ref 0.5–1.4)
DIFFERENTIAL METHOD BLD: ABNORMAL
EOSINOPHIL # BLD AUTO: 1 K/UL (ref 0–0.5)
EOSINOPHIL NFR BLD: 12 % (ref 0–8)
ERYTHROCYTE [DISTWIDTH] IN BLOOD BY AUTOMATED COUNT: 12.9 % (ref 11.5–14.5)
EST. GFR  (NO RACE VARIABLE): >60 ML/MIN/1.73 M^2
ESTIMATED AVG GLUCOSE: 94 MG/DL (ref 68–131)
GLUCOSE SERPL-MCNC: 73 MG/DL (ref 70–110)
HBA1C MFR BLD: 4.9 % (ref 4–5.6)
HCT VFR BLD AUTO: 41.2 % (ref 37–48.5)
HDLC SERPL-MCNC: 54 MG/DL (ref 40–75)
HDLC SERPL: 27 % (ref 20–50)
HGB BLD-MCNC: 13.6 G/DL (ref 12–16)
IMM GRANULOCYTES # BLD AUTO: 0.02 K/UL (ref 0–0.04)
IMM GRANULOCYTES NFR BLD AUTO: 0.3 % (ref 0–0.5)
LDLC SERPL CALC-MCNC: 132.6 MG/DL (ref 63–159)
LYMPHOCYTES # BLD AUTO: 2 K/UL (ref 1–4.8)
LYMPHOCYTES NFR BLD: 25.3 % (ref 18–48)
MCH RBC QN AUTO: 31.1 PG (ref 27–31)
MCHC RBC AUTO-ENTMCNC: 33 G/DL (ref 32–36)
MCV RBC AUTO: 94 FL (ref 82–98)
MONOCYTES # BLD AUTO: 0.4 K/UL (ref 0.3–1)
MONOCYTES NFR BLD: 5.4 % (ref 4–15)
NEUTROPHILS # BLD AUTO: 4.5 K/UL (ref 1.8–7.7)
NEUTROPHILS NFR BLD: 56.1 % (ref 38–73)
NONHDLC SERPL-MCNC: 146 MG/DL
NRBC BLD-RTO: 0 /100 WBC
PLATELET # BLD AUTO: 305 K/UL (ref 150–450)
PMV BLD AUTO: 10.7 FL (ref 9.2–12.9)
POTASSIUM SERPL-SCNC: 4.3 MMOL/L (ref 3.5–5.1)
PROT SERPL-MCNC: 7.4 G/DL (ref 6–8.4)
RBC # BLD AUTO: 4.37 M/UL (ref 4–5.4)
SODIUM SERPL-SCNC: 141 MMOL/L (ref 136–145)
TRIGL SERPL-MCNC: 67 MG/DL (ref 30–150)
TSH SERPL DL<=0.005 MIU/L-ACNC: 1.66 UIU/ML (ref 0.4–4)
WBC # BLD AUTO: 7.92 K/UL (ref 3.9–12.7)

## 2024-04-22 PROCEDURE — 3078F DIAST BP <80 MM HG: CPT | Mod: CPTII,S$GLB,, | Performed by: NURSE PRACTITIONER

## 2024-04-22 PROCEDURE — 99999 PR PBB SHADOW E&M-EST. PATIENT-LVL III: CPT | Mod: PBBFAC,,, | Performed by: NURSE PRACTITIONER

## 2024-04-22 PROCEDURE — 85025 COMPLETE CBC W/AUTO DIFF WBC: CPT | Performed by: NURSE PRACTITIONER

## 2024-04-22 PROCEDURE — 1160F RVW MEDS BY RX/DR IN RCRD: CPT | Mod: CPTII,S$GLB,, | Performed by: NURSE PRACTITIONER

## 2024-04-22 PROCEDURE — 83036 HEMOGLOBIN GLYCOSYLATED A1C: CPT | Performed by: NURSE PRACTITIONER

## 2024-04-22 PROCEDURE — 99396 PREV VISIT EST AGE 40-64: CPT | Mod: S$GLB,,, | Performed by: NURSE PRACTITIONER

## 2024-04-22 PROCEDURE — 80053 COMPREHEN METABOLIC PANEL: CPT | Performed by: NURSE PRACTITIONER

## 2024-04-22 PROCEDURE — 84443 ASSAY THYROID STIM HORMONE: CPT | Performed by: NURSE PRACTITIONER

## 2024-04-22 PROCEDURE — 80061 LIPID PANEL: CPT | Performed by: NURSE PRACTITIONER

## 2024-04-22 PROCEDURE — 1159F MED LIST DOCD IN RCRD: CPT | Mod: CPTII,S$GLB,, | Performed by: NURSE PRACTITIONER

## 2024-04-22 PROCEDURE — 36415 COLL VENOUS BLD VENIPUNCTURE: CPT | Mod: PO | Performed by: NURSE PRACTITIONER

## 2024-04-22 PROCEDURE — 3008F BODY MASS INDEX DOCD: CPT | Mod: CPTII,S$GLB,, | Performed by: NURSE PRACTITIONER

## 2024-04-22 PROCEDURE — 3074F SYST BP LT 130 MM HG: CPT | Mod: CPTII,S$GLB,, | Performed by: NURSE PRACTITIONER

## 2024-04-22 RX ORDER — ONDANSETRON 4 MG/1
4 TABLET, ORALLY DISINTEGRATING ORAL EVERY 8 HOURS PRN
Qty: 30 TABLET | Refills: 1 | Status: SHIPPED | OUTPATIENT
Start: 2024-04-22 | End: 2024-05-22 | Stop reason: SDUPTHER

## 2024-04-22 RX ORDER — METHYLPHENIDATE HYDROCHLORIDE 36 MG/1
36 TABLET ORAL EVERY MORNING
COMMUNITY
Start: 2024-04-05

## 2024-04-22 NOTE — PROGRESS NOTES
Subjective:       Patient ID: Nimo Miller is a 39 y.o. female.    Chief Complaint: Annual Exam    HPI per ROS.     Patient Active Problem List   Diagnosis    Anxiety and depression    Urinary incontinence    Weakness of pelvic floor    Pelvic floor dysfunction       Current Outpatient Medications:     methylphenidate HCl 36 MG CR tablet, Take 36 mg by mouth every morning., Disp: , Rfl:     acetaminophen (TYLENOL) 500 MG tablet, Take 500 mg by mouth every 6 (six) hours as needed., Disp: , Rfl:     diphenhydrAMINE (BENADRYL) 12.5 mg chewable tablet, Take 12.5 mg by mouth 4 (four) times daily as needed., Disp: , Rfl:     HEDY 0.1 mg/24 hr PTSW, Place onto the skin., Disp: , Rfl:     progesterone (PROMETRIUM) 200 MG capsule, Take 1 capsule (200 mg total) by mouth every evening., Disp: 30 capsule, Rfl: 11    sertraline (ZOLOFT) 100 MG tablet, Take 2 tablets (200 mg total) by mouth once daily., Disp: 180 tablet, Rfl: 1    UNABLE TO FIND, medication name: Testosterone cream 2% Apply 1/2 gram to inner thigh nightly, Disp: 30 g, Rfl: 5    The following portions of the patient's history were reviewed and updated as appropriate: allergies, past family history, past medical history, past social history and past surgical history.    Review of Systems   Constitutional:  Negative for fever.   HENT:  Negative for hearing loss, rhinorrhea and trouble swallowing.    Eyes:  Negative for discharge and visual disturbance.   Respiratory:  Negative for chest tightness and wheezing.    Cardiovascular:  Negative for chest pain and palpitations.   Gastrointestinal:  Positive for nausea (due to progesterone). Negative for blood in stool, constipation and vomiting.   Endocrine: Negative for polydipsia and polyuria.   Genitourinary:  Negative for difficulty urinating, dysuria, hematuria and menstrual problem.   Musculoskeletal:  Negative for joint swelling and neck pain.   Neurological:  Negative for headaches.   Psychiatric/Behavioral:   Positive for dysphoric mood. Negative for confusion.        Objective:      /77   Pulse 97   Wt 77.2 kg (170 lb 1.6 oz)   LMP 10/28/2022   SpO2 97%   BMI 29.20 kg/m²     Physical Exam  Constitutional:       General: She is not in acute distress.     Appearance: Normal appearance.   HENT:      Head: Normocephalic and atraumatic.      Nose: Nose normal.   Eyes:      Extraocular Movements: Extraocular movements intact.      Pupils: Pupils are equal, round, and reactive to light.   Cardiovascular:      Rate and Rhythm: Normal rate and regular rhythm.      Pulses: Normal pulses.      Heart sounds: Normal heart sounds.   Pulmonary:      Effort: Pulmonary effort is normal.      Breath sounds: Normal breath sounds.   Abdominal:      Palpations: Abdomen is soft.   Musculoskeletal:         General: No swelling or deformity. Normal range of motion.      Cervical back: Normal range of motion and neck supple.   Skin:     General: Skin is warm and dry.      Capillary Refill: Capillary refill takes less than 2 seconds.   Neurological:      General: No focal deficit present.      Mental Status: She is alert and oriented to person, place, and time.      Coordination: Coordination normal.      Gait: Gait normal.   Psychiatric:         Mood and Affect: Mood normal.         Behavior: Behavior normal.         Assessment:       1. Annual physical exam    2. Screening for diabetes mellitus        Plan:   Nimo Barrera was seen today for annual exam.    Diagnoses and all orders for this visit:    Annual physical exam  -     Lipid Panel; Future  -     Comprehensive Metabolic Panel; Future  -     CBC Auto Differential; Future  -     TSH; Future    Screening for diabetes mellitus  -     HEMOGLOBIN A1C; Future    Nausea  -     ondansetron (ZOFRAN-ODT) 4 MG TbDL; Take 1 tablet (4 mg total) by mouth every 8 (eight) hours as needed.

## 2024-05-09 NOTE — TELEPHONE ENCOUNTER
Care Due:                  Date            Visit Type   Department     Provider  --------------------------------------------------------------------------------                                EP -                              PRIMARY      [facility]  Last Visit: 11-      CARE ([facility])   MEDICINE       Deborah Hernadez  Next Visit: None Scheduled  None         None Found                                                            Last  Test          Frequency    Reason                     Performed    Due Date  --------------------------------------------------------------------------------    Office Visit  15 months..  sertraline...............  11- 02-    [facility] Embedded Care Due Messages. Reference number: [id number].   5/09/2024 5:15:11 AM CDT

## 2024-05-09 NOTE — TELEPHONE ENCOUNTER
Refill Encounter    PCP Visits: Recent Visits  No visits were found meeting these conditions.  Showing recent visits within past 360 days and meeting all other requirements  Future Appointments  No visits were found meeting these conditions.  Showing future appointments within next 720 days and meeting all other requirements     Last 3 Blood Pressure:   BP Readings from Last 3 Encounters:   04/22/24 126/77   03/21/24 110/80   12/04/23 103/70     Preferred Pharmacy:   [id number] [place], [place] - [address] [address]  Phone: [phone] Fax: [phone]    [facility][id number] [place], [place] - [address]  [address] [address]  Phone: [phone] Fax: [phone]    [facility] - [address] [address]  Phone: [phone] Fax: [phone]    Requested RX:  Requested Prescriptions     Pending Prescriptions Disp Refills    sertraline (ZOLOFT) 100 MG tablet [Pharmacy Med Name: SERTRALINE 100MG TABLETS] 180 tablet 1     Sig: TAKE 2 TABLETS(200 MG) BY MOUTH EVERY DAY      RX Route: Normal

## 2024-05-11 RX ORDER — SERTRALINE HYDROCHLORIDE 100 MG/1
200 TABLET, FILM COATED ORAL
Qty: 180 TABLET | Refills: 1 | Status: SHIPPED | OUTPATIENT
Start: 2024-05-11

## 2024-05-17 ENCOUNTER — PATIENT MESSAGE (OUTPATIENT)
Dept: FAMILY MEDICINE | Facility: CLINIC | Age: 39
End: 2024-05-17
Payer: COMMERCIAL

## 2024-05-22 DIAGNOSIS — R11.0 NAUSEA: ICD-10-CM

## 2024-05-23 RX ORDER — ONDANSETRON 4 MG/1
4 TABLET, ORALLY DISINTEGRATING ORAL EVERY 8 HOURS PRN
Qty: 30 TABLET | Refills: 1 | Status: SHIPPED | OUTPATIENT
Start: 2024-05-23 | End: 2024-06-19

## 2024-05-24 ENCOUNTER — PATIENT MESSAGE (OUTPATIENT)
Dept: OBSTETRICS AND GYNECOLOGY | Facility: CLINIC | Age: 39
End: 2024-05-24

## 2024-05-24 ENCOUNTER — OFFICE VISIT (OUTPATIENT)
Dept: OBSTETRICS AND GYNECOLOGY | Facility: CLINIC | Age: 39
End: 2024-05-24
Payer: COMMERCIAL

## 2024-05-24 VITALS — BODY MASS INDEX: 28.34 KG/M2 | WEIGHT: 166 LBS | HEIGHT: 64 IN

## 2024-05-24 DIAGNOSIS — R19.8 ALTERNATING CONSTIPATION AND DIARRHEA: Primary | ICD-10-CM

## 2024-05-24 DIAGNOSIS — M25.50 ARTHRALGIA, UNSPECIFIED JOINT: ICD-10-CM

## 2024-05-24 DIAGNOSIS — G43.809 OTHER MIGRAINE WITHOUT STATUS MIGRAINOSUS, NOT INTRACTABLE: ICD-10-CM

## 2024-05-24 DIAGNOSIS — R53.83 FATIGUE, UNSPECIFIED TYPE: ICD-10-CM

## 2024-05-24 DIAGNOSIS — R45.86 LABILE MOOD: ICD-10-CM

## 2024-05-24 PROCEDURE — 1159F MED LIST DOCD IN RCRD: CPT | Mod: CPTII,S$GLB,, | Performed by: OBSTETRICS & GYNECOLOGY

## 2024-05-24 PROCEDURE — 1160F RVW MEDS BY RX/DR IN RCRD: CPT | Mod: CPTII,S$GLB,, | Performed by: OBSTETRICS & GYNECOLOGY

## 2024-05-24 PROCEDURE — 99213 OFFICE O/P EST LOW 20 MIN: CPT | Mod: S$GLB,,, | Performed by: OBSTETRICS & GYNECOLOGY

## 2024-05-24 PROCEDURE — 3044F HG A1C LEVEL LT 7.0%: CPT | Mod: CPTII,S$GLB,, | Performed by: OBSTETRICS & GYNECOLOGY

## 2024-05-24 PROCEDURE — 3008F BODY MASS INDEX DOCD: CPT | Mod: CPTII,S$GLB,, | Performed by: OBSTETRICS & GYNECOLOGY

## 2024-05-24 PROCEDURE — 99999 PR PBB SHADOW E&M-EST. PATIENT-LVL III: CPT | Mod: PBBFAC,,, | Performed by: OBSTETRICS & GYNECOLOGY

## 2024-05-24 RX ORDER — ARIPIPRAZOLE 5 MG/1
5 TABLET ORAL
COMMUNITY
Start: 2024-05-03

## 2024-05-24 NOTE — PROGRESS NOTES
Chief Complaint: Multiple Concerns     HPI:      Nimo Miller is a 39 y.o.  who presents today to discuss PMDD. Pt has been seen by several providers for this issue over the past 2 years. Below is a chart review and following that, updates from today's visit:       Chart review:   3/22: Seen by Dr. Douglass - was then on Isibloom (Desogestrel, EE 0.15/0.03) Worked well to resolve migraines and PMS symptoms but decreased libido. Pt at that time was concerned about possible PMDD. Was on 150 mg Zoloft for depression at that time. Was offered increase of SSRI during luteal phase but she declined as wished to pursue hormonal options.   Plan at that point was to switch to Mircette (Desogestrel, EE, EE 0.15-0.02/0.01) but in note on  it mentions that pt is back on Isibloom.  : On Isibloom (Desogestrel, EE 0.15/0.03) with complaint of increased menstrual migraines but good control of mood symptoms. Plan was to add 0.5 mg estrogen during placebo period monthly.   : Since childbirth has had gradual worsening of multiple symtoms related to hormonal fluctuations both on and off of birth control and with manipulation of antidepressents.   Severe migraines, bloating cramping and with heavy bleeding off of birth control  Bleeding controlled on BC but cramping is worse during placebo  Has failed continuous dosing of BC and add back estrogen  Pt sees Psyc and they agree with combination of both SEVERE PMDD, migraines and now with bloating and cramping Trial of GnRH agonist/antagonist may be warranted prior to hyst   Pt declined Myfembree trial at that time.   : Underwent robotic hyst with BSO.   : reported adjusting to hormones (vivelle dot 0.05 mg/24 hr)    10/23: Seen by PRIMO Ferrara - Improved stability of mood since hysterectomy. Current use of Vivelle Dot 0.1 mg/d TD twice weekly, started current dosing in the past week. Reporting hot flashes, crying spells, anxiety/depression, headaches,  irritability, poor memory, palpitations, and well as significant pain. Recent Vivelle dot increase in the past week was due to significant lower abdominal cramping and pain, reports pain improved significantly with dose increase. Also notes profound joint pain with radiation of pain to skin of arms.   At that time pt was on Zoloft 100 mg daily.     12/23: Seen by Dr. Flores -  She reports her brain fog is better, energy is better and mood swings are better.  She still is having inflammatory pain and rhinitis since March she reports that she has been sleeping better since her hysterectomy.  She is following an anti-inflammatory diet and anti-inflammatory supplements but still has pain and migraines  Plan was to start on Prometrium 100 mg nightly and transdermal testosterone (2% topiclick nightly)    1/24: Seen by PRIMO Salter - At that point reported she was on Vivelle Dot 0.1 mg/d twice weekly and Prometrium 100 mg QHS in addition to Zoloft 200 mg daily. Pt reported she was still experiencing fatigue, joint and skin inflammation and severe bloating after eating certain foods, gas discomfort and migraines. Felt that mood symptoms were more stable but also reported she felt she'd had PMDD since adolescence. Started testosterone around this time.    3/24: Seen by Dr. Walter - Reported s brain fog, anxiety, difficulty concentrating and continued joint pain. Was looking for a new psychiatrist at that point. Increased Prometrium from 100 mg to 200 mg nightly.     Today:     Pt presents in clinic and is quickly tearful due to frustration. She has been trying to get symptoms under control for the past 2 years without feeling like she's seeing much improvement.   Reports that symptoms continue to be cyclical despite being s/p BSO and taking same dose of estrogen and progesterone daily.   Last week had an acne flare and worsening mood that has improved this week.   Continues to have joint pain, migraines, fatigue, and  "endorses frequent yeast infections. She has alternating diarrhea and constipation and since starting testosterone has significantly increased LE hair growth. Has an elevated libido now (when not on meds felt like libido was strong, OCPs in the past have suppressed libido).     Reports nausea and food texture sensitivity which worsened with increased progesterone dose but that rise in symptoms has leveled off.  Mood stabilized and energy improved with increased progesterone dose. Feels irritable in the evenings in particular and frequently yells at her children which is not her normal. This happens every day right around .     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     Physical Exam:      PHYSICAL EXAM:  Ht 5' 4" (1.626 m)   Wt 75.3 kg (166 lb 0.1 oz)   LMP 10/28/2022   BMI 28.49 kg/m²   Body mass index is 28.49 kg/m².     APPEARANCE: Well nourished, well developed, tearful and frustrated.     Results:     3/21/24:   FSH: 24.88  Progesterone: 0.4  Estradiol: 32  TSH: 1.729    23:  Albumin: 4  SHB  Free Testosterone: 14   Total Testosterone:  <0.04    Assessment/Plan:     Alternating constipation and diarrhea  - Discussed that this could be a result of the progesterone, but after weighing gains in energy level and mood stability, decided best to stay on current progesterone dose.     Arthralgia, unspecified joint  - Discussed that testosterone often helps with joint pain. Pt feels like it did help somewhat but has not resolved symptoms.   - Recommended pt reach out to PCP to discuss non-hormonal causes of joint pain.   - Consider addition of Cymbalta to patient's current med regimen    Labile mood  - Pt gets irritable, particularly with her children, every evening.   - Discussed that testosterone is not likely helping with this, and will " reassess after stopping testosterone.     Other migraine without status migrainosus, not intractable  - Migraines have persisted despite hormone dose being continuous and daily.   - Recommend neurology work up, consult placed.     Fatigue, unspecified type  - Discussed that stopping testosterone may have a negative impact on energy level.   - Will monitor at f/u.     Discussed today that we will stop testosterone and then f/u in 4 weeks to see how symptoms have evolved.   Introduced to the conversation that at this point some of these symptoms may not be hormone related and we may need to start exploring different avenues/explanations in order to get patient feeling better.     >1 hour of chart review spent on day of patient visit in order to establish timeline and figure out what treatments have been tried, etc.

## 2024-05-30 ENCOUNTER — TELEPHONE (OUTPATIENT)
Dept: NEUROLOGY | Facility: CLINIC | Age: 39
End: 2024-05-30
Payer: COMMERCIAL

## 2024-05-30 ENCOUNTER — PATIENT MESSAGE (OUTPATIENT)
Dept: FAMILY MEDICINE | Facility: CLINIC | Age: 39
End: 2024-05-30
Payer: COMMERCIAL

## 2024-06-06 ENCOUNTER — OFFICE VISIT (OUTPATIENT)
Dept: FAMILY MEDICINE | Facility: CLINIC | Age: 39
End: 2024-06-06
Payer: COMMERCIAL

## 2024-06-06 DIAGNOSIS — M25.50 CHRONIC PAIN OF MULTIPLE JOINTS: Primary | ICD-10-CM

## 2024-06-06 DIAGNOSIS — G89.29 CHRONIC PAIN OF MULTIPLE JOINTS: Primary | ICD-10-CM

## 2024-06-06 PROCEDURE — 1159F MED LIST DOCD IN RCRD: CPT | Mod: CPTII,95,, | Performed by: NURSE PRACTITIONER

## 2024-06-06 PROCEDURE — 1160F RVW MEDS BY RX/DR IN RCRD: CPT | Mod: CPTII,95,, | Performed by: NURSE PRACTITIONER

## 2024-06-06 PROCEDURE — 3044F HG A1C LEVEL LT 7.0%: CPT | Mod: CPTII,95,, | Performed by: NURSE PRACTITIONER

## 2024-06-06 PROCEDURE — 99213 OFFICE O/P EST LOW 20 MIN: CPT | Mod: 95,,, | Performed by: NURSE PRACTITIONER

## 2024-06-06 NOTE — PROGRESS NOTES
The patient location is: [place], [place]  The chief complaint leading to consultation is: joint pain    Visit type: audiovisual    Face to Face time with patient: 20 minutes of total time spent on the encounter, which includes face to face time and non-face to face time preparing to see the patient (eg, review of tests), Obtaining and/or reviewing separately obtained history, Documenting clinical information in the electronic or other health record, Independently interpreting results (not separately reported) and communicating results to the patient/family/caregiver, or Care coordination (not separately reported).     Each patient to whom he or she provides medical services by telemedicine is:  (1) informed of the relationship between the physician and patient and the respective role of any other health care provider with respect to management of the patient; and (2) notified that he or she may decline to receive medical services by telemedicine and may withdraw from such care at any time.    Notes:     Subjective:       Patient ID: Nimo Miller is a 39 y.o. female.    Chief Complaint: Joint Pain    Pain  This is a chronic problem. The current episode started more than 1 year ago. The problem occurs daily. The problem has been waxing and waning. Associated symptoms include arthralgias and weakness. Pertinent negatives include no chest pain, headaches, joint swelling, neck pain or vomiting. Nothing aggravates the symptoms. She has tried nothing for the symptoms.   Ms. Miller endorses joint pain of multiple sites: elbows, hips, knee, hands. Conerned for autoimmune process.     Patient Active Problem List   Diagnosis    Anxiety and depression    Urinary incontinence    Weakness of pelvic floor    Pelvic floor dysfunction       Current Outpatient Medications:     acetaminophen (TYLENOL) 500 MG tablet, Take 500 mg by mouth every 6 (six) hours as needed., Disp: , Rfl:     ARIPiprazole (ABILIFY) 5 MG Tab, Take 5 mg  by mouth., Disp: , Rfl:     HEDY 0.1 mg/24 hr PTSW, Place onto the skin., Disp: , Rfl:     methylphenidate HCl 36 MG CR tablet, Take 36 mg by mouth every morning., Disp: , Rfl:     ondansetron (ZOFRAN-ODT) 4 MG TbDL, Take 1 tablet (4 mg total) by mouth every 8 (eight) hours as needed., Disp: 30 tablet, Rfl: 1    progesterone (PROMETRIUM) 200 MG capsule, Take 1 capsule (200 mg total) by mouth every evening., Disp: 30 capsule, Rfl: 11    sertraline (ZOLOFT) 100 MG tablet, TAKE 2 TABLETS(200 MG) BY MOUTH EVERY DAY, Disp: 180 tablet, Rfl: 1    UNABLE TO FIND, medication name: Testosterone cream 2% Apply 1/2 gram to inner thigh nightly, Disp: 30 g, Rfl: 5    The following portions of the patient's history were reviewed and updated as appropriate: allergies, past family history, past medical history, past social history and past surgical history.    Review of Systems   Constitutional:  Negative for activity change and unexpected weight change.   HENT:  Negative for hearing loss, rhinorrhea and trouble swallowing.    Eyes:  Negative for discharge and visual disturbance.   Respiratory:  Negative for chest tightness and wheezing.    Cardiovascular:  Negative for chest pain and palpitations.   Gastrointestinal:  Positive for constipation. Negative for blood in stool, diarrhea and vomiting.   Endocrine: Negative for polydipsia and polyuria.   Genitourinary:  Negative for difficulty urinating, dysuria, hematuria and menstrual problem.   Musculoskeletal:  Positive for arthralgias. Negative for joint swelling and neck pain.   Neurological:  Positive for weakness. Negative for headaches.   Psychiatric/Behavioral:  Negative for confusion and dysphoric mood.        Objective:      LMP 10/28/2022     Physical Exam  Constitutional:       General: She is not in acute distress.     Appearance: Normal appearance.   Pulmonary:      Effort: Pulmonary effort is normal.   Musculoskeletal:         General: Normal range of motion.    Neurological:      Mental Status: She is alert and oriented to person, place, and time.   Psychiatric:         Mood and Affect: Mood normal.         Behavior: Behavior normal.         Assessment:       1. Chronic pain of multiple joints        Plan:   Nimo Barrera was seen today for joint pain.    Diagnoses and all orders for this visit:    Chronic pain of multiple joints  -     MARK; Future  -     RHEUMATOID FACTOR; Future  -     C-REACTIVE PROTEIN; Future  -     Sedimentation rate; Future  -     Ambulatory referral/consult to Rheumatology; Future

## 2024-06-10 ENCOUNTER — LAB VISIT (OUTPATIENT)
Dept: LAB | Facility: HOSPITAL | Age: 39
End: 2024-06-10
Attending: NURSE PRACTITIONER
Payer: COMMERCIAL

## 2024-06-10 DIAGNOSIS — M25.50 CHRONIC PAIN OF MULTIPLE JOINTS: ICD-10-CM

## 2024-06-10 DIAGNOSIS — G89.29 CHRONIC PAIN OF MULTIPLE JOINTS: ICD-10-CM

## 2024-06-10 LAB
CRP SERPL-MCNC: 2.3 MG/L (ref 0–8.2)
ERYTHROCYTE [SEDIMENTATION RATE] IN BLOOD BY PHOTOMETRIC METHOD: 7 MM/HR (ref 0–36)
RHEUMATOID FACT SERPL-ACNC: <13 IU/ML (ref 0–15)

## 2024-06-10 PROCEDURE — 36415 COLL VENOUS BLD VENIPUNCTURE: CPT | Mod: PO | Performed by: NURSE PRACTITIONER

## 2024-06-10 PROCEDURE — 86038 ANTINUCLEAR ANTIBODIES: CPT | Performed by: NURSE PRACTITIONER

## 2024-06-10 PROCEDURE — 86140 C-REACTIVE PROTEIN: CPT | Performed by: NURSE PRACTITIONER

## 2024-06-10 PROCEDURE — 85652 RBC SED RATE AUTOMATED: CPT | Performed by: NURSE PRACTITIONER

## 2024-06-10 PROCEDURE — 86431 RHEUMATOID FACTOR QUANT: CPT | Performed by: NURSE PRACTITIONER

## 2024-06-11 LAB — ANA SER QL IF: NORMAL

## 2024-06-18 NOTE — PROGRESS NOTES
New Patient     The patient location is: [place]  The chief complaint leading to consultation is: headache     Visit type: audiovisual    Face to Face time with patient: 35  59 minutes of total time spent on the encounter, which includes face to face time and non-face to face time preparing to see the patient (eg, review of tests), Obtaining and/or reviewing separately obtained history, Documenting clinical information in the electronic or other health record, Independently interpreting results (not separately reported) and communicating results to the patient/family/caregiver, or Care coordination (not separately reported).     Each patient to whom he or she provides medical services by telemedicine is:  (1) informed of the relationship between the physician and patient and the respective role of any other health care provider with respect to management of the patient; and (2) notified that he or she may decline to receive medical services by telemedicine and may withdraw from such care at any time.    Notes:       SUBJECTIVE:  Patient ID: Nimo Miller   MRN: [id number]  Referred By: Dr. Amanda Kim*  Chief Complaint: No chief complaint on file.      History of Present Illness:   39 y.o. female with  anxiety, depression, asthma, PMDD, s/p hysterectomy/BSO (7/21/23). Low back pain, who presents to virtual visit alone for evaluation of headaches.     Pt reports a long history of migraines. She had one migraine at 16 years old, then little to none until late 20s when she began having frequent migraines and later diagnosed with PMDD. She has since had a Hysterectomy/BSO (7/21/23), which has improved symptoms. Currently working with OBGYN to manage symptoms with estrogen/progesterone without success. Pt would like to establish care with headache medicine.    Reports taking OTC daily. Ibuprofen, Excedrin, tylenol [id number].     PMHx negative for TBI, Meningitis, Aneurysms, Kidney Stones,  GI bleed,  osteoporosis, CAD/MI, CVA/TIA, DM, cancer    Family Hx negative for Migraines    Headache History:  Location/Radiation - R unilateral temporalis/occipitalis can radiate to back of head and jaw  Quality - throbbing, underlying pain dull/achy  Duration - 3hrs - one week  Intensity (range) - mild to severe /10  Frequency - 16/30 ha days per month, 1/30 are debilitating  Triggers - caffeine, hormone changes, low sugar diet  Aggravating Factors - light, sound, smells, activity  Alleviating Factors - ice pack, drinking water  Recent Changes - unable to control despite hormone replacement   Prodrome/Aura - no  HA today? - yes, R occipitalis  Time of day of most headaches- evening time  Sleep - pain will keep pt awake making it difficult to fall/stay asleep;    + snoring, waking up groggy. At times waking up with a headache even if she went to sleep without one.    Associated symptoms with the headache:   Meningeal symptoms - photophobia, phonophobia, exercise intolerance   Nausea/vomiting  Impaired concentration   Pain worsened with physical activity        Symptoms of increased intracranial pressure:   Whooshing sounds   Tinnitus - chronic in R ear - worsened with migraine  Can be worse sometimes with laying down at times      Treatments Tried   Tylenol   Advil - only mildly helps  Excedrin - helps but tries to limit  Zoloft 200mg taking now  Zofran - helps  Abilify 5mg taking now  *avoid BB s/t asthma  *avoid triptans as patient taking zoloft & abilify - pt has hx of serotonin syndrome    Social History  Alcohol - 1-2 glasses/month  Smoke - denies; quit 2008  Recreational Drug Use- denies    Current Medications:    Current Outpatient Medications:     acetaminophen (TYLENOL) 500 MG tablet, Take 500 mg by mouth every 6 (six) hours as needed., Disp: , Rfl:     ARIPiprazole (ABILIFY) 5 MG Tab, Take 5 mg by mouth., Disp: , Rfl:     HEDY 0.1 mg/24 hr PTSW, Place onto the skin., Disp: , Rfl:     methylphenidate HCl 36 MG CR  tablet, Take 36 mg by mouth every morning., Disp: , Rfl:     ondansetron (ZOFRAN-ODT) 4 MG TbDL, Take 1 tablet (4 mg total) by mouth every 8 (eight) hours as needed., Disp: 30 tablet, Rfl: 1    progesterone (PROMETRIUM) 200 MG capsule, Take 1 capsule (200 mg total) by mouth every evening., Disp: 30 capsule, Rfl: 11    sertraline (ZOLOFT) 100 MG tablet, TAKE 2 TABLETS(200 MG) BY MOUTH EVERY DAY, Disp: 180 tablet, Rfl: 1    UNABLE TO FIND, medication name: Testosterone cream 2% Apply 1/2 gram to inner thigh nightly, Disp: 30 g, Rfl: 5    Review of Systems - as per HPI, otherwise a balanced 10 systems review is negative.    OBJECTIVE:  Vitals:  LMP 10/28/2022     Physical Exam: certain limitations due to video visit platform, able to perform the following with the patient's assistance:  Constitutional: she appears well-developed and well-nourished. she is well groomed. NAD  HENT:    Head: Normocephalic and atraumatic  Eyes: Conjunctivae and EOM are normal. Pupils are equal and round  Neck: Occiput and trapezius NTTP   Musculoskeletal: Normal range of motion.   Psychiatric: Normal mood and affect.     Neuro exam:    Mental status:  The patient is alert and oriented to person, place and time.  Language is intact and fluent  Remote and recent memory are intact  Normal attention and concentration  Mood is stable    Cranial Nerves:  Extraocular movements are intact and without nystagmus.    Facial movement is symmetric.  Facial sensation is intact.    Tongue in midline without fasciculation.   FROM of neck in all (6) directions without pain  Shoulder shrug symmetrical.    Motor:  Normal muscle bulk and symmetry. No fasciculations were noted.   Tremor not apparent       Review of Data:   Notes from pcp, obgyn reviewed   Labs:  Lab Visit on 06/10/2024   Component Date Value Ref Range Status    MARK Screen 06/10/2024 Negative <1:80  Negative <1:80 Final    Rheumatoid Factor 06/10/2024 <13.0  0.0 - 15.0 IU/mL Final    CRP  06/10/2024 2.3  0.0 - 8.2 mg/L Final    Sed Rate 06/10/2024 7  0 - 36 mm/Hr Final   Lab Visit on 04/22/2024   Component Date Value Ref Range Status    Cholesterol 04/22/2024 200 (H)  120 - 199 mg/dL Final    Triglycerides 04/22/2024 67  30 - 150 mg/dL Final    HDL 04/22/2024 54  40 - 75 mg/dL Final    LDL Cholesterol 04/22/2024 132.6  63.0 - 159.0 mg/dL Final    HDL/Cholesterol Ratio 04/22/2024 27.0  20.0 - 50.0 % Final    Total Cholesterol/HDL Ratio 04/22/2024 3.7  2.0 - 5.0 Final    Non-HDL Cholesterol 04/22/2024 146  mg/dL Final    Sodium 04/22/2024 141  136 - 145 mmol/L Final    Potassium 04/22/2024 4.3  3.5 - 5.1 mmol/L Final    Chloride 04/22/2024 106  95 - 110 mmol/L Final    CO2 04/22/2024 25  23 - 29 mmol/L Final    Glucose 04/22/2024 73  70 - 110 mg/dL Final    BUN 04/22/2024 16  6 - 20 mg/dL Final    Creatinine 04/22/2024 0.8  0.5 - 1.4 mg/dL Final    Calcium 04/22/2024 9.5  8.7 - 10.5 mg/dL Final    Total Protein 04/22/2024 7.4  6.0 - 8.4 g/dL Final    Albumin 04/22/2024 4.0  3.5 - 5.2 g/dL Final    Total Bilirubin 04/22/2024 0.2  0.1 - 1.0 mg/dL Final    Alkaline Phosphatase 04/22/2024 70  55 - 135 U/L Final    AST 04/22/2024 18  10 - 40 U/L Final    ALT 04/22/2024 9 (L)  10 - 44 U/L Final    eGFR 04/22/2024 >60.0  >60 mL/min/1.73 m^2 Final    Anion Gap 04/22/2024 10  8 - 16 mmol/L Final    WBC 04/22/2024 7.92  3.90 - 12.70 K/uL Final    RBC 04/22/2024 4.37  4.00 - 5.40 M/uL Final    Hemoglobin 04/22/2024 13.6  12.0 - 16.0 g/dL Final    Hematocrit 04/22/2024 41.2  37.0 - 48.5 % Final    MCV 04/22/2024 94  82 - 98 fL Final    MCH 04/22/2024 31.1 (H)  27.0 - 31.0 pg Final    MCHC 04/22/2024 33.0  32.0 - 36.0 g/dL Final    RDW 04/22/2024 12.9  11.5 - 14.5 % Final    Platelets 04/22/2024 305  150 - 450 K/uL Final    MPV 04/22/2024 10.7  9.2 - 12.9 fL Final    Immature Granulocytes 04/22/2024 0.3  0.0 - 0.5 % Final    Gran # (ANC) 04/22/2024 4.5  1.8 - 7.7 K/uL Final    Immature Grans (Abs) 04/22/2024 0.02   0.00 - 0.04 K/uL Final    Lymph # 04/22/2024 2.0  1.0 - 4.8 K/uL Final    Mono # 04/22/2024 0.4  0.3 - 1.0 K/uL Final    Eos # 04/22/2024 1.0 (H)  0.0 - 0.5 K/uL Final    Baso # 04/22/2024 0.07  0.00 - 0.20 K/uL Final    nRBC 04/22/2024 0  0 /100 WBC Final    Gran % 04/22/2024 56.1  38.0 - 73.0 % Final    Lymph % 04/22/2024 25.3  18.0 - 48.0 % Final    Mono % 04/22/2024 5.4  4.0 - 15.0 % Final    Eosinophil % 04/22/2024 12.0 (H)  0.0 - 8.0 % Final    Basophil % 04/22/2024 0.9  0.0 - 1.9 % Final    Differential Method 04/22/2024 Automated   Final    TSH 04/22/2024 1.660  0.400 - 4.000 uIU/mL Final    Hemoglobin A1C 04/22/2024 4.9  4.0 - 5.6 % Final    Estimated Avg Glucose 04/22/2024 94  68 - 131 mg/dL Final   Lab Visit on 03/21/2024   Component Date Value Ref Range Status    TSH 03/21/2024 1.729  0.400 - 4.000 uIU/mL Final    Estradiol 03/21/2024 32  See Text pg/mL Final    Progesterone 03/21/2024 0.4  See Text ng/mL Final    Follicle Stimulating Hormone 03/21/2024 24.88  See Text mIU/mL Final     Imaging:  No results found for this or any previous visit.  Note: I have independently reviewed any/all imaging/labs/tests and agree with the report (s) as documented.  Any discrepancies will be as noted/demarcated by free text.  ANDRADE RIVER-C 6/18/2024    ASSESSMENT:  No diagnosis found.      PLAN:  - Discussed symptoms appear to be consistent with migraine, medication overuse. Discussed this with patient along with treatment options and patient agreed with the following plan:    - medication overuse: Stop all Over-the-counter medications. prednisone taper.   - rescue: start ubrelvy. Medically necessary at this time as pt taking zoloft and abilify - will avoid triptan as this could cause serotonin syndrome.   - snoring: referral to sleep medicine  - will defer preventative medication at this time. Will reevaluate need after resolve of rebound headaches. Consider topamax.   - continue magnesium, coQ10, and B-complex  supplements  - risks, benefits, and potential side effects of ubrelvy discussed   - alternative treatment options offered   - importance of healthy diet, regular exercise and sleep hygiene in the treatment of headaches    - Start tracking headaches via Migraine Wallace carmelina on phone   - RTC in 3 months         I have discussed realistic goals of care with patient at length as well as medication options, and need for lifestyle adjustment. I have explained that treatment will take time. We have agreed that the goal will be to reduce frequency/intensity/quantity of HA, not to be completely HA free. I have explained my non narcotic policy regarding headache treatment.    Patient agreeable to work on lifestyle adjustments.      Questions and concerns were sought and answered to the patient's stated verbal satisfaction.  The patient verbalizes understanding and agreement with the above stated treatment plan.     CC: Deborah Hernadez MD Monique Smith, P-C  [facility]  [phone]    Dr. Ashraf was available during today's encounter.     100% of this 59 minute visit was spent face to face, and 50% or more of this visit included discussion of the treatment plan, symptom management, and coordination of care. Questions and concerns were sought and answered to the patient's stated verbal satisfaction.  The patient verbalizes understanding and agreement with the above stated treatment plan.

## 2024-06-19 ENCOUNTER — OFFICE VISIT (OUTPATIENT)
Dept: NEUROLOGY | Facility: CLINIC | Age: 39
End: 2024-06-19
Payer: COMMERCIAL

## 2024-06-19 DIAGNOSIS — G43.701 CHRONIC MIGRAINE WITHOUT AURA WITH STATUS MIGRAINOSUS, NOT INTRACTABLE: Primary | Chronic | ICD-10-CM

## 2024-06-19 DIAGNOSIS — R06.83 SNORING: ICD-10-CM

## 2024-06-19 DIAGNOSIS — F32.81 PMDD (PREMENSTRUAL DYSPHORIC DISORDER): ICD-10-CM

## 2024-06-19 DIAGNOSIS — G44.40 MEDICATION OVERUSE HEADACHE: ICD-10-CM

## 2024-06-19 DIAGNOSIS — G43.809 OTHER MIGRAINE WITHOUT STATUS MIGRAINOSUS, NOT INTRACTABLE: ICD-10-CM

## 2024-06-19 RX ORDER — UBROGEPANT 100 MG/1
100 TABLET ORAL
Qty: 10 TABLET | Refills: 2 | Status: SHIPPED | OUTPATIENT
Start: 2024-06-19

## 2024-06-19 RX ORDER — ONDANSETRON 4 MG/1
4 TABLET, ORALLY DISINTEGRATING ORAL 2 TIMES DAILY
Qty: 25 TABLET | Refills: 2 | Status: SHIPPED | OUTPATIENT
Start: 2024-06-19

## 2024-06-19 RX ORDER — PREDNISONE 20 MG/1
20 TABLET ORAL DAILY
Qty: 12 TABLET | Refills: 0 | Status: SHIPPED | OUTPATIENT
Start: 2024-06-19

## 2024-06-21 ENCOUNTER — OFFICE VISIT (OUTPATIENT)
Dept: OBSTETRICS AND GYNECOLOGY | Facility: CLINIC | Age: 39
End: 2024-06-21
Payer: COMMERCIAL

## 2024-06-21 DIAGNOSIS — R19.8 ALTERNATING CONSTIPATION AND DIARRHEA: ICD-10-CM

## 2024-06-21 DIAGNOSIS — R45.86 LABILE MOOD: ICD-10-CM

## 2024-06-21 DIAGNOSIS — M25.50 ARTHRALGIA, UNSPECIFIED JOINT: Primary | ICD-10-CM

## 2024-06-21 DIAGNOSIS — R53.83 FATIGUE, UNSPECIFIED TYPE: ICD-10-CM

## 2024-06-21 DIAGNOSIS — G43.809 OTHER MIGRAINE WITHOUT STATUS MIGRAINOSUS, NOT INTRACTABLE: ICD-10-CM

## 2024-06-21 NOTE — PROGRESS NOTES
"The patient location is: Home  The chief complaint leading to consultation is: Multiple Issues    Visit type: audiovisual    Face to Face time with patient: 13 minutes  20 minutes of total time spent on the encounter, which includes face to face time and non-face to face time preparing to see the patient (eg, review of tests), Obtaining and/or reviewing separately obtained history, Documenting clinical information in the electronic or other health record, Independently interpreting results (not separately reported) and communicating results to the patient/family/caregiver, or Care coordination (not separately reported).         Each patient to whom he or she provides medical services by telemedicine is:  (1) informed of the relationship between the physician and patient and the respective role of any other health care provider with respect to management of the patient; and (2) notified that he or she may decline to receive medical services by telemedicine and may withdraw from such care at any time.    Notes:     Chief Complaint: F/u Multiple Issues     HPI:      Nimo Miller is a 39 y.o.  who presents today for f/u after stopping testosterone as part of a management strategy for multiple issues which have been going on for the past few years. Of note, last visit we determined to work on the following:   Joint pain, labile mood, migraines, mental fog, GI issues, and fatigue.  Stopped testosterone and then felt like she went  "Strongly into estrogen dominance. Lobo, joint pain, stomach poofed out, brain fog was insane".     Since going back on testosterone at full dosing she feels like joint pain has improved and brain fog improved back to baseline where she saw me. There is room to improve on both fronts, but stopping testosterone showed her how very helpful it has actually been. RA panel was negative for other sources of joint pain. Suggested trial of Cymbalta which pt will discuss with her " psychiatrist next week.     Pt was able to see neuro for migraines. Migraines were worse without testosterone. Going to try Ubrelvy now to see if it decreases HA frequency.     Without testosterone alternating constipation and loose stools turned into loose stools exclusively. Back on testosterone constipation is back but pt reports that all the other benefits of testosterone  make this a small price to pay.     Labile mood was unchanged off of testosterone. Fatigue was significantly worse.     Physical Exam:     APPEARANCE: Well nourished, well developed, in no acute distress.    Assessment/Plan:     Arthralgia, unspecified joint  Labile mood  Fatigue, unspecified type  - Will discuss addition of Cymbalta to address these symptoms with psychiatrist and let me know their thoughts next week. Cymbalta has the potential to help all three above problems, especially now that it seems testosterone is going to be an essential part of patient's therapy moving forward.   - She does have her own therapist and will continue this mental health work as well.     Alternating constipation and diarrhea  - Will reassess at next visit and if still bothersome, may send to GI for further recs.    Other migraine without status migrainosus, not intractable  - Trial of Ubelvy started by neuro

## 2024-06-25 ENCOUNTER — PATIENT MESSAGE (OUTPATIENT)
Dept: OBSTETRICS AND GYNECOLOGY | Facility: CLINIC | Age: 39
End: 2024-06-25
Payer: COMMERCIAL

## 2024-07-03 NOTE — TELEPHONE ENCOUNTER
No care due was identified.  [facility] Embedded Care Due Messages. Reference number: [id number].   7/03/2024 9:12:54 AM CDT

## 2024-07-03 NOTE — TELEPHONE ENCOUNTER
Refill Encounter    PCP Visits: Recent Visits  No visits were found meeting these conditions.  Showing recent visits within past 360 days and meeting all other requirements  Future Appointments  No visits were found meeting these conditions.  Showing future appointments within next 720 days and meeting all other requirements     Last 3 Blood Pressure:   BP Readings from Last 3 Encounters:   04/22/24 126/77   03/21/24 110/80   12/04/23 103/70     Preferred Pharmacy:   [id number] [place], [place] - [address] [address]  Phone: [phone] Fax: [phone]    [id number] [place], [place] - [address] [facility] [address]  [address] [facility] [address]  Phone: [phone] Fax: [phone]    [facility] [address]  [address].  [facility] [address]  Phone: [phone] Fax: [phone]    [facility]  [address]  [facility] [address]  Phone: [phone] Fax: [phone]    Requested RX:  Requested Prescriptions     Pending Prescriptions Disp Refills    sertraline (ZOLOFT) 100 MG tablet 180 tablet 1     Sig: Take 2 tablets (200 mg total) by mouth.      RX Route: Normal

## 2024-07-05 RX ORDER — SERTRALINE HYDROCHLORIDE 100 MG/1
200 TABLET, FILM COATED ORAL DAILY
Qty: 180 TABLET | Refills: 1 | Status: SHIPPED | OUTPATIENT
Start: 2024-07-05

## 2024-08-07 ENCOUNTER — PATIENT MESSAGE (OUTPATIENT)
Dept: NEUROLOGY | Facility: CLINIC | Age: 39
End: 2024-08-07
Payer: COMMERCIAL

## 2024-08-15 ENCOUNTER — OFFICE VISIT (OUTPATIENT)
Dept: FAMILY MEDICINE | Facility: CLINIC | Age: 39
End: 2024-08-15
Payer: COMMERCIAL

## 2024-08-15 VITALS — WEIGHT: 166 LBS | BODY MASS INDEX: 28.34 KG/M2 | HEIGHT: 64 IN

## 2024-08-15 DIAGNOSIS — G89.29 CHRONIC PAIN OF RIGHT KNEE: Primary | ICD-10-CM

## 2024-08-15 DIAGNOSIS — M25.561 CHRONIC PAIN OF RIGHT KNEE: Primary | ICD-10-CM

## 2024-08-15 PROCEDURE — 3044F HG A1C LEVEL LT 7.0%: CPT | Mod: CPTII,95,, | Performed by: NURSE PRACTITIONER

## 2024-08-15 PROCEDURE — 1160F RVW MEDS BY RX/DR IN RCRD: CPT | Mod: CPTII,95,, | Performed by: NURSE PRACTITIONER

## 2024-08-15 PROCEDURE — 3008F BODY MASS INDEX DOCD: CPT | Mod: CPTII,95,, | Performed by: NURSE PRACTITIONER

## 2024-08-15 PROCEDURE — 1159F MED LIST DOCD IN RCRD: CPT | Mod: CPTII,95,, | Performed by: NURSE PRACTITIONER

## 2024-08-15 PROCEDURE — 99213 OFFICE O/P EST LOW 20 MIN: CPT | Mod: 95,,, | Performed by: NURSE PRACTITIONER

## 2024-08-15 NOTE — PROGRESS NOTES
The patient location is: [place], [place]  The chief complaint leading to consultation is: knee pain    Visit type: audiovisual    Face to Face time with patient: 15  minutes of total time spent on the encounter, which includes face to face time and non-face to face time preparing to see the patient (eg, review of tests), Obtaining and/or reviewing separately obtained history, Documenting clinical information in the electronic or other health record, Independently interpreting results (not separately reported) and communicating results to the patient/family/caregiver, or Care coordination (not separately reported).     Each patient to whom he or she provides medical services by telemedicine is:  (1) informed of the relationship between the physician and patient and the respective role of any other health care provider with respect to management of the patient; and (2) notified that he or she may decline to receive medical services by telemedicine and may withdraw from such care at any time.    Notes:     Subjective:       Patient ID: Nimo Miller is a 39 y.o. female.    Chief Complaint: Knee Pain    Knee Pain   The injury mechanism is unknown. The pain is present in the right knee. The pain is moderate. The pain has been Fluctuating since onset. Pertinent negatives include no inability to bear weight, loss of motion, loss of sensation, muscle weakness, numbness or tingling. She reports no foreign bodies present. The symptoms are aggravated by movement, palpation and weight bearing. She has tried non-weight bearing and rest for the symptoms. The treatment provided moderate relief.     Patient Active Problem List   Diagnosis    Anxiety and depression    Urinary incontinence    Weakness of pelvic floor    Pelvic floor dysfunction    Chronic migraine without aura with status migrainosus, not intractable    Snoring    PMDD (premenstrual dysphoric disorder)    Medication overuse headache       Current Outpatient  Medications:     ARIPiprazole (ABILIFY) 5 MG Tab, Take 5 mg by mouth., Disp: , Rfl:     HEDY 0.1 mg/24 hr PTSW, Place onto the skin., Disp: , Rfl:     methylphenidate HCl 36 MG CR tablet, Take 36 mg by mouth every morning., Disp: , Rfl:     ondansetron (ZOFRAN-ODT) 4 MG TbDL, Take 1 tablet (4 mg total) by mouth 2 (two) times daily., Disp: 25 tablet, Rfl: 2    predniSONE (DELTASONE) 20 MG tablet, Take 1 tablet (20 mg total) by mouth once daily., Disp: 12 tablet, Rfl: 0    progesterone (PROMETRIUM) 200 MG capsule, Take 1 capsule (200 mg total) by mouth every evening., Disp: 30 capsule, Rfl: 11    sertraline (ZOLOFT) 100 MG tablet, Take 2 tablets (200 mg total) by mouth once daily., Disp: 180 tablet, Rfl: 1    ubrogepant (UBRELVY) 100 mg tablet, Take 1 tablet (100 mg total) by mouth as needed for Migraine. If symptoms persist or return, may repeat dose after 2 hours. Maximum: 200 mg per 24 hours, Disp: 10 tablet, Rfl: 2    UNABLE TO FIND, medication name: Testosterone cream 2% Apply 1/2 gram to inner thigh nightly, Disp: 30 g, Rfl: 5    The following portions of the patient's history were reviewed and updated as appropriate: allergies, past family history, past medical history, past social history and past surgical history.    Review of Systems   Constitutional:  Negative for activity change and unexpected weight change.   HENT:  Negative for hearing loss, rhinorrhea and trouble swallowing.    Eyes:  Negative for discharge and visual disturbance.   Respiratory:  Negative for chest tightness and wheezing.    Cardiovascular:  Negative for chest pain and palpitations.   Gastrointestinal:  Negative for blood in stool, constipation, diarrhea and vomiting.   Endocrine: Negative for polydipsia and polyuria.   Genitourinary:  Negative for difficulty urinating, dysuria, hematuria and menstrual problem.   Musculoskeletal:  Positive for arthralgias and joint swelling. Negative for neck pain.   Neurological:  Negative for  "tingling, weakness, numbness and headaches.   Psychiatric/Behavioral:  Negative for confusion and dysphoric mood.        Objective:      Ht 5' 4" (1.626 m)   Wt 75.3 kg (166 lb 0.1 oz)   LMP 10/28/2022   BMI 28.49 kg/m²     Physical Exam  Constitutional:       General: She is not in acute distress.     Appearance: Normal appearance.   Pulmonary:      Effort: Pulmonary effort is normal.   Musculoskeletal:         General: Normal range of motion.      Comments: Unable to examine due to VV   Neurological:      Mental Status: She is alert and oriented to person, place, and time.   Psychiatric:         Mood and Affect: Mood normal.         Behavior: Behavior normal.         Assessment:       1. Chronic pain of right knee        Plan:   Nimo Barrera was seen today for knee pain.    Diagnoses and all orders for this visit:    Chronic pain of right knee  -     Ambulatory referral/consult to Sports Medicine; Future  -     X-Ray Knee 3 View Right; Future          "

## 2024-08-20 ENCOUNTER — HOSPITAL ENCOUNTER (OUTPATIENT)
Dept: RADIOLOGY | Facility: OTHER | Age: 39
Discharge: HOME | End: 2024-08-20
Attending: NURSE PRACTITIONER
Payer: COMMERCIAL

## 2024-08-20 DIAGNOSIS — M25.561 CHRONIC PAIN OF RIGHT KNEE: ICD-10-CM

## 2024-08-20 DIAGNOSIS — G89.29 CHRONIC PAIN OF RIGHT KNEE: ICD-10-CM

## 2024-08-20 PROCEDURE — 73562 X-RAY EXAM OF KNEE 3: CPT | Mod: TC,FY,RT

## 2024-08-20 PROCEDURE — 73562 X-RAY EXAM OF KNEE 3: CPT | Mod: 26,RT,, | Performed by: RADIOLOGY

## 2024-08-22 ENCOUNTER — OFFICE VISIT (OUTPATIENT)
Dept: ORTHOPEDICS | Facility: CLINIC | Age: 39
End: 2024-08-22
Payer: COMMERCIAL

## 2024-08-22 DIAGNOSIS — M76.891 HAMSTRING TENDONITIS OF RIGHT THIGH: Primary | ICD-10-CM

## 2024-08-22 DIAGNOSIS — G89.29 CHRONIC PAIN OF RIGHT KNEE: ICD-10-CM

## 2024-08-22 DIAGNOSIS — M25.561 CHRONIC PAIN OF RIGHT KNEE: ICD-10-CM

## 2024-08-22 PROCEDURE — 3044F HG A1C LEVEL LT 7.0%: CPT | Mod: CPTII,S$GLB,, | Performed by: PHYSICIAN ASSISTANT

## 2024-08-22 PROCEDURE — 99203 OFFICE O/P NEW LOW 30 MIN: CPT | Mod: S$GLB,,, | Performed by: PHYSICIAN ASSISTANT

## 2024-08-22 PROCEDURE — 99999 PR PBB SHADOW E&M-EST. PATIENT-LVL II: CPT | Mod: PBBFAC,,, | Performed by: PHYSICIAN ASSISTANT

## 2024-08-26 NOTE — PROGRESS NOTES
Subjective     Patient ID: Nimo Miller is a 39 y.o. female.    Chief Complaint: No chief complaint on file.    HPI    Patient is a 39 year old female who presents to clinic with chief complaint of a-traumatic medial right knee pain x 20 years intermittently. Patient stated that she [place] airport [place] and after that she had intermittent knee issues. She will usually treat with rest and OTC medication as needed. Her pain is mainly medial. No locking or catching, or popping.     Review of Systems   Constitutional: Negative for chills and fever.   Cardiovascular:  Negative for chest pain.   Respiratory:  Negative for cough and shortness of breath.    Skin:  Negative for color change, dry skin, itching, nail changes, poor wound healing and rash.   Musculoskeletal:         Right medial knee pain    Neurological:  Negative for dizziness.   Psychiatric/Behavioral:  Negative for altered mental status. The patient is not nervous/anxious.    All other systems reviewed and are negative.         Objective     General    Constitutional: She is oriented to person, place, and time. She appears well-developed and well-nourished. No distress.   HENT:   Head: Atraumatic.   Eyes: Conjunctivae are normal.   Cardiovascular:  Normal rate.            Pulmonary/Chest: Effort normal.   Neurological: She is alert and oriented to person, place, and time.   Psychiatric: She has a normal mood and affect. Her behavior is normal.           Right Knee Exam     Tenderness   The patient is tender to palpation of the medial hamstring.    Range of Motion   The patient has normal right knee ROM.    Tests   Meniscus   Tatyana:  Medial - negative Lateral - negative  Ligament Examination   Lachman: normal (-1 to 2mm)   PCL-Posterior Drawer: normal (0 to 2mm)     MCL - Valgus: normal (0 to 2mm)  LCL - Varus: normal    Other   Sensation: normal    Muscle Strength   Right Lower Extremity   Quadriceps:  5/5   Hamstring:   5/5       Physical Exam  Constitutional:       General: She is not in acute distress.     Appearance: She is well-developed and well-nourished. She is not diaphoretic.   HENT:      Head: Atraumatic.   Eyes:      Conjunctiva/sclera: Conjunctivae normal.   Cardiovascular:      Rate and Rhythm: Normal rate.   Pulmonary:      Effort: Pulmonary effort is normal.   Musculoskeletal:      Right knee:      Instability Tests: Medial Tatyana test negative and lateral Tatyana test negative.   Neurological:      Mental Status: She is alert and oriented to person, place, and time.   Psychiatric:         Mood and Affect: Mood and affect normal.         Behavior: Behavior normal.      RADS: reviewed by myself:   No acute fracture or dislocation seen.  No soft tissue edema or significant suprapatellar joint effusion.  No radiopaque retained foreign body.          Assessment and Plan     Encounter Diagnoses   Name Primary?    Chronic pain of right knee     Hamstring tendonitis of right thigh Yes         Discussed plan with the patient. At this time will treat with PT. She is to return to clinic as needed.

## 2024-09-10 ENCOUNTER — OFFICE VISIT (OUTPATIENT)
Dept: SLEEP MEDICINE | Facility: CLINIC | Age: 39
End: 2024-09-10
Payer: COMMERCIAL

## 2024-09-10 DIAGNOSIS — R06.83 SNORING: Primary | ICD-10-CM

## 2024-09-10 DIAGNOSIS — R51.9 FREQUENT HEADACHES: ICD-10-CM

## 2024-09-10 DIAGNOSIS — R35.1 NOCTURIA: ICD-10-CM

## 2024-09-10 DIAGNOSIS — G47.19 EXCESSIVE DAYTIME SLEEPINESS: ICD-10-CM

## 2024-09-10 DIAGNOSIS — Z82.0 FAMILY HISTORY OF SLEEP APNEA: ICD-10-CM

## 2024-09-10 PROCEDURE — 99204 OFFICE O/P NEW MOD 45 MIN: CPT | Mod: 95,,, | Performed by: PHYSICIAN ASSISTANT

## 2024-09-10 PROCEDURE — 3044F HG A1C LEVEL LT 7.0%: CPT | Mod: CPTII,95,, | Performed by: PHYSICIAN ASSISTANT

## 2024-09-10 PROCEDURE — 1160F RVW MEDS BY RX/DR IN RCRD: CPT | Mod: CPTII,95,, | Performed by: PHYSICIAN ASSISTANT

## 2024-09-10 PROCEDURE — 1159F MED LIST DOCD IN RCRD: CPT | Mod: CPTII,95,, | Performed by: PHYSICIAN ASSISTANT

## 2024-09-10 NOTE — PROGRESS NOTES
The chief complaint leading to consultation is: snoring     Visit type: audiovisual     The patient location is: [place]     19 minutes of total time spent on the encounter, which includes face to face time and non-face to face time preparing to see the patient (eg, review of tests), Obtaining and/or reviewing separately obtained history, Documenting clinical information in the electronic or other health record, Independently interpreting results (not separately reported) and communicating results to the patient/family/caregiver, or Care coordination (not separately reported).      Each patient to whom he or she provides medical services by telemedicine is:  (1) informed of the relationship between the physician and patient and the respective role of any other health care provider with respect to management of the patient; and (2) notified that he or she may decline to receive medical services by telemedicine and may withdraw from such care at any time.        Referred by Dilia Nino FNP     NEW PATIENT VISIT    Nimo Miller  is a pleasant 39 y.o. female  with PMH significant for chronic migraines, anxiety, depression, ADHD who presents for sleep evaluation following referral from Neurology      C/o snoring, poor occasionally disrupted and un-refreshing sleep, and excessive daytime sleepiness and fatigue. Does endorse occasional accidental dozing when sedentary. Does endorse taking daily afternoon nap/rest. Denies drowsiness when driving. Denies sleep walking/talking. Denies dream enactment behaviors. Denies sx of RLS. Does endorse hx of frequent migraines. States her Neurologist recommended evaluation for BLANCA, which is why she presents today. Does endorse family history of BLANCA (sister).    SLEEP SCHEDULE   Environment    Bed Time 10PM   Sleep Latency Not long   Arousals 0-1   Nocturia 0-1   Back to sleep Not long   Wake time 6AM   Naps Afternoon nap (1-1.5 hours)   Work        Past Medical History:    Diagnosis Date    Abnormal Pap smear of cervix     Asthma      Patient Active Problem List   Diagnosis    Anxiety and depression    Urinary incontinence    Weakness of pelvic floor    Pelvic floor dysfunction    Chronic migraine without aura with status migrainosus, not intractable    Snoring    PMDD (premenstrual dysphoric disorder)    Medication overuse headache       Current Outpatient Medications:     ARIPiprazole (ABILIFY) 5 MG Tab, Take 5 mg by mouth., Disp: , Rfl:     HEDY 0.1 mg/24 hr PTSW, Place onto the skin., Disp: , Rfl:     methylphenidate HCl 36 MG CR tablet, Take 36 mg by mouth every morning., Disp: , Rfl:     ondansetron (ZOFRAN-ODT) 4 MG TbDL, Take 1 tablet (4 mg total) by mouth 2 (two) times daily., Disp: 25 tablet, Rfl: 2    progesterone (PROMETRIUM) 200 MG capsule, Take 1 capsule (200 mg total) by mouth every evening., Disp: 30 capsule, Rfl: 11    sertraline (ZOLOFT) 100 MG tablet, Take 2 tablets (200 mg total) by mouth once daily., Disp: 180 tablet, Rfl: 1    ubrogepant (UBRELVY) 100 mg tablet, Take 1 tablet (100 mg total) by mouth as needed for Migraine. If symptoms persist or return, may repeat dose after 2 hours. Maximum: 200 mg per 24 hours, Disp: 10 tablet, Rfl: 2    UNABLE TO FIND, medication name: Testosterone cream 2% Apply 1/2 gram to inner thigh nightly, Disp: 30 g, Rfl: 5     There were no vitals filed for this visit.    Physical Exam:    GEN:   Well-appearing  Psych:  Appropriate affect, demonstrates insight  SKIN:  No rash on the face or bridge of the nose      LABS:   Lab Results   Component Value Date    HGB 13.6 2024    CO2 25 2024         RECORDS REVIEWED:    No previous sleep study    ASSESSMENT    [place] Sleepiness Scale:  Sitting and readin  Watching TV:    2  Passenger in a car x 1 hr:  1  Sitting quietly after lunch:  3  Lying down to rest in PM:  2  Sitting, inactive in public:  0  Sitting+ talking to someone:  0  Stopped in traffic:    0  Total    9/24    PROBLEM DESCRIPTION/ Sx on Presentation  STATUS   Sx BLANCA   + snoring, denies witnessed apneas  + wakes feeling un-refreshed  + sister has BLANCA  New   Daytime Sx   + sleepiness when inactive   ESS 9/24 on intake  New   Insomnia   Trouble falling asleep: no issues  Arousals:         0-1 x nightly  Hard to get back to sleep?: no issues    Prior pertinent medications:  Current pertinent medications: methylphenidate (ADHD)  New   Nocturia   x 0-1 per sleep period  New   Other issues: frequent migraines      PLAN      -recommend sleep testing   -HST ordered  -discussed trial therapy if BLANCA present and the patient is open to a trial of CPAP therapy  -discussed BLANCA and PAP with patient in detail, including possible complications of untreated BLANCA like heart attack/stroke  -advised on strict driving precautions; advised never to drive drowsy     Advised on plan of care. Answered all patient questions. Patient verbalized understanding and voiced agreement with plan of care.     RTC if dx of BLANCA made and CPAP ordered, will need follow up 31-90 days after receiving machine for compliance         The patient was given open opportunity to ask questions and/or express concerns about treatment plan. All questions/concerns were discussed.     Two patient identifiers used prior to evaluation.

## 2024-09-10 NOTE — PROGRESS NOTES
Established Patient   SUBJECTIVE:  Patient ID: Nimo Miller   Chief Complaint: No chief complaint on file.    History of Present Illness:  Nimo Miller is a 39 y.o. female who presents for follow-up of headaches via virtual visit.     The patient location is: [place]  The chief complaint leading to consultation is: headache     Visit type: audiovisual    Face to Face time with patient: 6  15 minutes of total time spent on the encounter, which includes face to face time and non-face to face time preparing to see the patient (eg, review of tests), Obtaining and/or reviewing separately obtained history, Documenting clinical information in the electronic or other health record, Independently interpreting results (not separately reported) and communicating results to the patient/family/caregiver, or Care coordination (not separately reported).     Each patient to whom he or she provides medical services by telemedicine is:  (1) informed of the relationship between the physician and patient and the respective role of any other health care provider with respect to management of the patient; and (2) notified that he or she may decline to receive medical services by telemedicine and may withdraw from such care at any time.     Recommendations made at last Office Visit on 6/19/24:  - medication overuse: Stop all Over-the-counter medications. prednisone taper.   - rescue: start ubrelvy. Medically necessary at this time as pt taking zoloft and abilify - will avoid triptan as this could cause serotonin syndrome.   - snoring: referral to sleep medicine  - will defer preventative medication at this time. Will reevaluate need after resolve of rebound headaches. Consider topamax.   - continue magnesium, coQ10, and B-complex supplements    09/11/2024 - Interval History:  Reports rebound headaches have resolved.   Still getting headaches around cycle. Using ice packs. 1-2 migraines.   Did not get ubrelvy but does not feel  like she needs it at this time. Pt will call pharmacy re: delivery if she needs in the future.  Did speak with sleep medicine and plans to schedule sleep study.     History of Present Illness:   39 y.o. female with  anxiety, depression, asthma, PMDD, s/p hysterectomy/BSO (7/21/23). Low back pain, who presents to virtual visit alone for evaluation of headaches.      Pt reports a long history of migraines. She had one migraine at 16 years old, then little to none until late 20s when she began having frequent migraines and later diagnosed with PMDD. She has since had a Hysterectomy/BSO (7/21/23), which has improved symptoms. Currently working with OBGYN to manage symptoms with estrogen/progesterone without success. Pt would like to establish care with headache medicine.    Reports taking OTC daily. Ibuprofen, Excedrin, tylenol [id number].      PMHx negative for TBI, Meningitis, Aneurysms, Kidney Stones,  GI bleed, osteoporosis, CAD/MI, CVA/TIA, DM, cancer     Family Hx negative for Migraines     Headache History:  Location/Radiation - R unilateral temporalis/occipitalis can radiate to back of head and jaw  Quality - throbbing, underlying pain dull/achy  Duration - 3hrs - one week  Intensity (range) - mild to severe /10  Frequency - 16/30 ha days per month, 1/30 are debilitating  Triggers - caffeine, hormone changes, low sugar diet  Aggravating Factors - light, sound, smells, activity  Alleviating Factors - ice pack, drinking water  Recent Changes - unable to control despite hormone replacement   Prodrome/Aura - no  HA today? - yes, R occipitalis  Time of day of most headaches- evening time  Sleep - pain will keep pt awake making it difficult to fall/stay asleep;    + snoring, waking up groggy. At times waking up with a headache even if she went to sleep without one.     Associated symptoms with the headache:   Meningeal symptoms - photophobia, phonophobia, exercise intolerance   Nausea/vomiting  Impaired concentration    Pain worsened with physical activity          Symptoms of increased intracranial pressure:   Whooshing sounds   Tinnitus - chronic in R ear - worsened with migraine  Can be worse sometimes with laying down at times        Treatments Tried   Tylenol   Advil - only mildly helps  Excedrin - helps but tries to limit  Zoloft 200mg taking now  Zofran - helps  Abilify 5mg taking now  *avoid BB s/t asthma  *avoid triptans as patient taking zoloft & abilify - pt has hx of serotonin syndrome     Social History  Alcohol - 1-2 glasses/month  Smoke - denies; quit 2008  Recreational Drug Use- denies  Current Medications:    Current Outpatient Medications:     ARIPiprazole (ABILIFY) 5 MG Tab, Take 5 mg by mouth., Disp: , Rfl:     HEDY 0.1 mg/24 hr PTSW, Place onto the skin., Disp: , Rfl:     methylphenidate HCl 36 MG CR tablet, Take 36 mg by mouth every morning., Disp: , Rfl:     ondansetron (ZOFRAN-ODT) 4 MG TbDL, Take 1 tablet (4 mg total) by mouth 2 (two) times daily., Disp: 25 tablet, Rfl: 2    progesterone (PROMETRIUM) 200 MG capsule, Take 1 capsule (200 mg total) by mouth every evening., Disp: 30 capsule, Rfl: 11    sertraline (ZOLOFT) 100 MG tablet, Take 2 tablets (200 mg total) by mouth once daily., Disp: 180 tablet, Rfl: 1    ubrogepant (UBRELVY) 100 mg tablet, Take 1 tablet (100 mg total) by mouth as needed for Migraine. If symptoms persist or return, may repeat dose after 2 hours. Maximum: 200 mg per 24 hours, Disp: 10 tablet, Rfl: 2    UNABLE TO FIND, medication name: Testosterone cream 2% Apply 1/2 gram to inner thigh nightly, Disp: 30 g, Rfl: 5    Review of Systems - A review of 10+ systems was conducted with pertinent positive and negative findings documented in HPI with all other systems reviewed and negative.    PFSH: Past medical, family, and social history reviewed as documented in chart with pertinent positive medical, family, and social history detailed in HPI.    OBJECTIVE:  Vitals: LMP 10/28/2022       Physical Exam:  Constitutional: she appears well-developed and well-nourished. she is well groomed. NAD.    Review of Data:   Notes from sleep medicine reviewed   Labs:  Lab Visit on 06/10/2024   Component Date Value Ref Range Status    MARK Screen 06/10/2024 Negative <1:80  Negative <1:80 Final    Rheumatoid Factor 06/10/2024 <13.0  0.0 - 15.0 IU/mL Final    CRP 06/10/2024 2.3  0.0 - 8.2 mg/L Final    Sed Rate 06/10/2024 7  0 - 36 mm/Hr Final   Lab Visit on 04/22/2024   Component Date Value Ref Range Status    Cholesterol 04/22/2024 200 (H)  120 - 199 mg/dL Final    Triglycerides 04/22/2024 67  30 - 150 mg/dL Final    HDL 04/22/2024 54  40 - 75 mg/dL Final    LDL Cholesterol 04/22/2024 132.6  63.0 - 159.0 mg/dL Final    HDL/Cholesterol Ratio 04/22/2024 27.0  20.0 - 50.0 % Final    Total Cholesterol/HDL Ratio 04/22/2024 3.7  2.0 - 5.0 Final    Non-HDL Cholesterol 04/22/2024 146  mg/dL Final    Sodium 04/22/2024 141  136 - 145 mmol/L Final    Potassium 04/22/2024 4.3  3.5 - 5.1 mmol/L Final    Chloride 04/22/2024 106  95 - 110 mmol/L Final    CO2 04/22/2024 25  23 - 29 mmol/L Final    Glucose 04/22/2024 73  70 - 110 mg/dL Final    BUN 04/22/2024 16  6 - 20 mg/dL Final    Creatinine 04/22/2024 0.8  0.5 - 1.4 mg/dL Final    Calcium 04/22/2024 9.5  8.7 - 10.5 mg/dL Final    Total Protein 04/22/2024 7.4  6.0 - 8.4 g/dL Final    Albumin 04/22/2024 4.0  3.5 - 5.2 g/dL Final    Total Bilirubin 04/22/2024 0.2  0.1 - 1.0 mg/dL Final    Alkaline Phosphatase 04/22/2024 70  55 - 135 U/L Final    AST 04/22/2024 18  10 - 40 U/L Final    ALT 04/22/2024 9 (L)  10 - 44 U/L Final    eGFR 04/22/2024 >60.0  >60 mL/min/1.73 m^2 Final    Anion Gap 04/22/2024 10  8 - 16 mmol/L Final    WBC 04/22/2024 7.92  3.90 - 12.70 K/uL Final    RBC 04/22/2024 4.37  4.00 - 5.40 M/uL Final    Hemoglobin 04/22/2024 13.6  12.0 - 16.0 g/dL Final    Hematocrit 04/22/2024 41.2  37.0 - 48.5 % Final    MCV 04/22/2024 94  82 - 98 fL Final    MCH 04/22/2024  31.1 (H)  27.0 - 31.0 pg Final    MCHC 04/22/2024 33.0  32.0 - 36.0 g/dL Final    RDW 04/22/2024 12.9  11.5 - 14.5 % Final    Platelets 04/22/2024 305  150 - 450 K/uL Final    MPV 04/22/2024 10.7  9.2 - 12.9 fL Final    Immature Granulocytes 04/22/2024 0.3  0.0 - 0.5 % Final    Gran # (ANC) 04/22/2024 4.5  1.8 - 7.7 K/uL Final    Immature Grans (Abs) 04/22/2024 0.02  0.00 - 0.04 K/uL Final    Lymph # 04/22/2024 2.0  1.0 - 4.8 K/uL Final    Mono # 04/22/2024 0.4  0.3 - 1.0 K/uL Final    Eos # 04/22/2024 1.0 (H)  0.0 - 0.5 K/uL Final    Baso # 04/22/2024 0.07  0.00 - 0.20 K/uL Final    nRBC 04/22/2024 0  0 /100 WBC Final    Gran % 04/22/2024 56.1  38.0 - 73.0 % Final    Lymph % 04/22/2024 25.3  18.0 - 48.0 % Final    Mono % 04/22/2024 5.4  4.0 - 15.0 % Final    Eosinophil % 04/22/2024 12.0 (H)  0.0 - 8.0 % Final    Basophil % 04/22/2024 0.9  0.0 - 1.9 % Final    Differential Method 04/22/2024 Automated   Final    TSH 04/22/2024 1.660  0.400 - 4.000 uIU/mL Final    Hemoglobin A1C 04/22/2024 4.9  4.0 - 5.6 % Final    Estimated Avg Glucose 04/22/2024 94  68 - 131 mg/dL Final   Lab Visit on 03/21/2024   Component Date Value Ref Range Status    TSH 03/21/2024 1.729  0.400 - 4.000 uIU/mL Final    Estradiol 03/21/2024 32  See Text pg/mL Final    Progesterone 03/21/2024 0.4  See Text ng/mL Final    Follicle Stimulating Hormone 03/21/2024 24.88  See Text mIU/mL Final     Imaging:  No results found for this or any previous visit.  Note: I have independently reviewed any/all imaging/labs/tests and agree with the report (s) as documented.  Any discrepancies will be as noted/demarcated by free text.  PERICO, ANDRADE-C 9/11/2024    ASSESSMENT:  1. Chronic migraine without aura with status migrainosus, not intractable    2. Snoring    3. Medication overuse headache            PLAN:  - continue to avoid OTC meds at this time. Pt to notify NP if unable to get Ubrelvy  - f/u with sleep medicine   - Continue tracking headaches   - Discussed  goals of therapy are to decrease the frequency, intensity, and duration of headaches  - Alternative treatment options offered   - RTC in 6 months or sooner if needed             CC: Deborah Hernadez MD Monique Smith, FNP-C  [facility]   [phone]    Dr. Ashraf was available during today's encounter.

## 2024-09-11 ENCOUNTER — OFFICE VISIT (OUTPATIENT)
Dept: NEUROLOGY | Facility: CLINIC | Age: 39
End: 2024-09-11
Payer: COMMERCIAL

## 2024-09-11 DIAGNOSIS — G44.40 MEDICATION OVERUSE HEADACHE: ICD-10-CM

## 2024-09-11 DIAGNOSIS — G43.701 CHRONIC MIGRAINE WITHOUT AURA WITH STATUS MIGRAINOSUS, NOT INTRACTABLE: Primary | ICD-10-CM

## 2024-09-11 DIAGNOSIS — R06.83 SNORING: ICD-10-CM

## 2024-09-11 PROCEDURE — 99213 OFFICE O/P EST LOW 20 MIN: CPT | Mod: 95,,,

## 2024-09-11 PROCEDURE — 3044F HG A1C LEVEL LT 7.0%: CPT | Mod: CPTII,95,,

## 2024-09-20 ENCOUNTER — TELEPHONE (OUTPATIENT)
Dept: SLEEP MEDICINE | Facility: OTHER | Age: 39
End: 2024-09-20
Payer: COMMERCIAL

## 2024-09-23 ENCOUNTER — HOSPITAL ENCOUNTER (OUTPATIENT)
Dept: SLEEP MEDICINE | Facility: OTHER | Age: 39
Discharge: HOME | End: 2024-09-23
Attending: PHYSICIAN ASSISTANT
Payer: COMMERCIAL

## 2024-09-23 DIAGNOSIS — G47.19 EXCESSIVE DAYTIME SLEEPINESS: ICD-10-CM

## 2024-09-23 DIAGNOSIS — R51.9 FREQUENT HEADACHES: ICD-10-CM

## 2024-09-23 DIAGNOSIS — R06.83 SNORING: ICD-10-CM

## 2024-09-23 DIAGNOSIS — Z82.0 FAMILY HISTORY OF SLEEP APNEA: ICD-10-CM

## 2024-09-23 DIAGNOSIS — R35.1 NOCTURIA: ICD-10-CM

## 2024-09-23 PROCEDURE — 95800 SLP STDY UNATTENDED: CPT

## 2024-09-30 ENCOUNTER — PATIENT MESSAGE (OUTPATIENT)
Dept: SLEEP MEDICINE | Facility: CLINIC | Age: 39
End: 2024-09-30
Payer: COMMERCIAL

## 2024-12-31 NOTE — TELEPHONE ENCOUNTER
Care Due:                  Date            Visit Type   Department     Provider  --------------------------------------------------------------------------------    Last Visit: None Found      None         None Found  Next Visit: None Scheduled  None         None Found                                                            Last  Test          Frequency    Reason                     Performed    Due Date  --------------------------------------------------------------------------------    Office Visit  15 months..  sertraline...............  Not Found    Overdue    [employer] Embedded Care Due Messages. Reference number: [id number].   12/31/2024 5:08:16 PM CST

## 2025-01-02 ENCOUNTER — OFFICE VISIT (OUTPATIENT)
Dept: FAMILY MEDICINE | Facility: CLINIC | Age: 40
End: 2025-01-02
Payer: COMMERCIAL

## 2025-01-02 DIAGNOSIS — M79.652 LEFT THIGH PAIN: Primary | ICD-10-CM

## 2025-01-07 NOTE — TELEPHONE ENCOUNTER
No care due was identified.  [facility] Embedded Care Due Messages. Reference number: [id number].   1/07/2025 8:39:50 AM CST

## 2025-01-07 NOTE — TELEPHONE ENCOUNTER
Refill Encounter    PCP Visits: Recent Visits  No visits were found meeting these conditions.  Showing recent visits within past 360 days and meeting all other requirements  Future Appointments  No visits were found meeting these conditions.  Showing future appointments within next 720 days and meeting all other requirements     Last 3 Blood Pressure:   BP Readings from Last 3 Encounters:   04/22/24 126/77   03/21/24 110/80   12/04/23 103/70     Preferred Pharmacy:   [id number] [place], [place] - [address] [address]  Phone: [phone] Fax: [phone]    [id number] [place], [place] - [address] [facility] [address] [facility] [address]  [address] [facility] [address]  Phone: [phone] Fax: [phone]    [facility] - [facility] [address]  [address]  [facility] [address]  Phone: [phone] Fax: [phone]    [facility]  [address] [facility] [facility] [address]  [facility] [address]  Phone: [phone] Fax: [phone]    Requested RX:  Requested Prescriptions     Pending Prescriptions Disp Refills    sertraline (ZOLOFT) 100 MG tablet 180 tablet 1     Sig: Take 2 tablets (200 mg total) by mouth once daily.      RX Route: Normal

## 2025-01-08 ENCOUNTER — PATIENT MESSAGE (OUTPATIENT)
Dept: FAMILY MEDICINE | Facility: CLINIC | Age: 40
End: 2025-01-08
Payer: COMMERCIAL

## 2025-01-08 RX ORDER — SERTRALINE HYDROCHLORIDE 100 MG/1
200 TABLET, FILM COATED ORAL DAILY
Qty: 60 TABLET | Refills: 0 | Status: SHIPPED | OUTPATIENT
Start: 2025-01-08

## 2025-01-08 NOTE — TELEPHONE ENCOUNTER
"----- Message from Imani Linn sent at 1/8/2025  9:06 AM CST -----  Regarding: Medication  Refill  Request                  Reply in [facility]:  NO      Please refill the medication listed below. Please call the patient      [phone] (M)      Medication      (ZOLOFT) 100 MG tablet  /  90 Day Supply    Additional Notes:   Patient states, "she 's completely out and hasn't had medication four days."       Preferred Pharmacy:  [id number] - [address] AT [address]   Phone: [phone]  Fax: [phone]  "

## 2025-01-10 ENCOUNTER — CLINICAL SUPPORT (OUTPATIENT)
Dept: REHABILITATION | Facility: HOSPITAL | Age: 40
End: 2025-01-10
Payer: COMMERCIAL

## 2025-01-10 DIAGNOSIS — Z74.09 IMPAIRED FUNCTIONAL MOBILITY, BALANCE, GAIT, AND ENDURANCE: ICD-10-CM

## 2025-01-10 DIAGNOSIS — M79.652 LEFT THIGH PAIN: Primary | ICD-10-CM

## 2025-01-10 DIAGNOSIS — M62.81 MUSCLE WEAKNESS OF EXTREMITY: ICD-10-CM

## 2025-01-10 PROCEDURE — 97112 NEUROMUSCULAR REEDUCATION: CPT | Mod: PO

## 2025-01-10 PROCEDURE — 97161 PT EVAL LOW COMPLEX 20 MIN: CPT | Mod: PO

## 2025-01-10 NOTE — PROGRESS NOTES
The patient location is: [place], [place]  The chief complaint leading to consultation is: Thigh pain    Visit type: audiovisual    Face to Face time with patient: 15  minutes of total time spent on the encounter, which includes face to face time and non-face to face time preparing to see the patient (eg, review of tests), Obtaining and/or reviewing separately obtained history, Documenting clinical information in the electronic or other health record, Independently interpreting results (not separately reported) and communicating results to the patient/family/caregiver, or Care coordination (not separately reported).     Each patient to whom he or she provides medical services by telemedicine is:  (1) informed of the relationship between the physician and patient and the respective role of any other health care provider with respect to management of the patient; and (2) notified that he or she may decline to receive medical services by telemedicine and may withdraw from such care at any time.    Notes:     Subjective:       Patient ID: Nimo Miller is a 39 y.o. female.    Chief Complaint: Leg Pain    Leg Pain   The injury mechanism was a twisting injury. The pain is present in the left thigh. The quality of the pain is described as aching. The pain is moderate. The pain has been Fluctuating since onset. Pertinent negatives include no inability to bear weight, loss of motion, loss of sensation, muscle weakness, numbness or tingling. She reports no foreign bodies present. The symptoms are aggravated by movement, palpation and weight bearing. She has tried rest, non-weight bearing, heat and NSAIDs for the symptoms. The treatment provided moderate relief.     Patient Active Problem List   Diagnosis    Anxiety and depression    Urinary incontinence    Weakness of pelvic floor    Pelvic floor dysfunction    Chronic migraine without aura with status migrainosus, not intractable    Snoring    PMDD (premenstrual dysphoric  disorder)    Medication overuse headache    Left thigh pain    Impaired functional mobility, balance, gait, and endurance    Muscle weakness of extremity       Current Outpatient Medications:     ARIPiprazole (ABILIFY) 5 MG Tab, Take 5 mg by mouth., Disp: , Rfl:     HEDY 0.1 mg/24 hr PTSW, Place onto the skin., Disp: , Rfl:     methylphenidate HCl 36 MG CR tablet, Take 36 mg by mouth every morning., Disp: , Rfl:     ondansetron (ZOFRAN-ODT) 4 MG TbDL, Take 1 tablet (4 mg total) by mouth 2 (two) times daily., Disp: 25 tablet, Rfl: 2    progesterone (PROMETRIUM) 200 MG capsule, Take 1 capsule (200 mg total) by mouth every evening., Disp: 30 capsule, Rfl: 11    sertraline (ZOLOFT) 100 MG tablet, Take 2 tablets (200 mg total) by mouth once daily., Disp: 60 tablet, Rfl: 0    sertraline (ZOLOFT) 100 MG tablet, Take 2 tablets (200 mg total) by mouth once daily., Disp: 60 tablet, Rfl: 0    ubrogepant (UBRELVY) 100 mg tablet, Take 1 tablet (100 mg total) by mouth as needed for Migraine. If symptoms persist or return, may repeat dose after 2 hours. Maximum: 200 mg per 24 hours, Disp: 10 tablet, Rfl: 2    UNABLE TO FIND, medication name: Testosterone cream 2% Apply 1/2 gram to inner thigh nightly, Disp: 30 g, Rfl: 5    The following portions of the patient's history were reviewed and updated as appropriate: allergies, past family history, past medical history, past social history and past surgical history.    Review of Systems   Constitutional:  Positive for activity change. Negative for unexpected weight change.   HENT:  Negative for hearing loss, rhinorrhea and trouble swallowing.    Eyes:  Negative for discharge and visual disturbance.   Respiratory:  Negative for chest tightness and wheezing.    Cardiovascular:  Negative for chest pain and palpitations.   Gastrointestinal:  Negative for blood in stool, constipation, diarrhea and vomiting.   Endocrine: Negative for polydipsia and polyuria.   Genitourinary:  Negative for  difficulty urinating, dysuria, hematuria and menstrual problem.   Musculoskeletal:  Positive for myalgias. Negative for arthralgias, joint swelling and neck pain.   Neurological:  Negative for tingling, weakness, numbness and headaches.   Psychiatric/Behavioral:  Negative for confusion and dysphoric mood.        Objective:      LMP 10/28/2022     Physical Exam  Constitutional:       General: She is not in acute distress.     Appearance: Normal appearance.   Cardiovascular:      Rate and Rhythm: Normal rate and regular rhythm.      Pulses: Normal pulses.      Heart sounds: Normal heart sounds.   Pulmonary:      Effort: Pulmonary effort is normal.      Breath sounds: Normal breath sounds.   Musculoskeletal:         General: Normal range of motion.      Comments: Unable to examine due to VV   Skin:     General: Skin is warm and dry.   Neurological:      Mental Status: She is alert and oriented to person, place, and time.   Psychiatric:         Mood and Affect: Mood normal.         Behavior: Behavior normal.         Assessment:       1. Left thigh pain        Plan:   Nimo Barrera was seen today for leg pain.    Diagnoses and all orders for this visit:    Left thigh pain  -     Ambulatory referral/consult to Physical/Occupational Therapy; Future

## 2025-01-10 NOTE — PLAN OF CARE
[facility]   Physical Therapy Initial Evaluation      Name: Nimo Barrera [facility] Number: [id number]    Therapy Diagnosis:   Encounter Diagnoses   Name Primary?    Left thigh pain Yes    Impaired functional mobility, balance, gait, and endurance     Muscle weakness of extremity         Physician: Flakita Geronimo, *    Physician Orders: PT Eval and Treat   Medical Diagnosis from Referral: Left thigh pain [M79.652]   Evaluation Date: 1/10/2025  Authorization Period Expiration: 1/2/26  Plan of Care Expiration: 4/10/25  Progress Note Due: 2/10/25  Date of Surgery: N/A  Visit # / Visits authorized: 1/ 1   FOTO: 1/ 3    Precautions: Standard     Time In: 11:30  Time Out: 12:17  Total Billable Time: 47 minutes    Subjective     Date of onset: November 2024    History of current condition - iNmo Barrera reports: she does [employer]. She was practicing a split and was hovering with support. She dropped and hear some popping. Since then, she has had pain in the LEFT sciatic/hamstring area. She has pain with stretching forward which is limiting toward her participation in [employer]. She does get a tingling when she stretches, but it does not go to the foot. She has bene diagnosed with hamstring tendonitis. She does not take a lot of anti-inflammatories due to rebound headaches. She has taken it 1-2x and had some relief, but not much. She does notice decreased tisha as a result of the leg pain.     Falls: no    Imaging: none for hamstring, low back    Prior Therapy: not for this  Social History: no steps in home, has steps to enter and she will feel the discomfort  Occupation: burlesque - limited secondary to pain  Prior Level of Function: independent  Current Level of Function: limited from her hobby as a result of pain     Pain:  Current 2/10, worst 8/10, best 2/10   Location: left leg  Description: tingling during a forward stretch; aching at baseline  Aggravating Factors: see above  Easing  "Factors: tried stretching which helps a little     Patients goals: return to her hobby without restriction, be able to  stuff around the house without having to squat all the way down, be able to walk a little faster      Medical History:   Past Medical History:   Diagnosis Date    Abnormal Pap smear of cervix     Asthma        Surgical History:   Nimo Miller  has a past surgical history that includes none; Colposcopy; Hysterectomy (Bilateral, 07/2023); and Oophorectomy.    Medications:   Nimo Barrera has a current medication list which includes the following prescription(s): aripiprazole, ivan, methylphenidate hcl, ondansetron, progesterone, sertraline, sertraline, ubrelvy, and UNABLE TO FIND.    Allergies:   Review of patient's allergies indicates:   Allergen Reactions    Tioconazole Itching and Swelling    Fluoxetine Other (See Comments)     Chills, SOB, Fatigue, Dizziness, Joint and Muscle aches.  "full like symptoms; serotonin syndrome"  Chills, SOB, Fatigue, Dizziness, Joint and Muscle aches.    Joint pain, dizziness, fatigue          Objective        Posture:  WFL    Lumbar Range of Motion:    Limitations Pain   Flexion 0%   no        Extension 0%   No         Left Side Bending 0% no        Right Side Bending 0% no          Right rotation: pain in left side  Rotation range of motion is full    Lower Extremity Strength  Right LE  Left LE    Quadriceps: 5/5 Quadriceps: 5/5   Hamstrings: 4+/5 Hamstrings: 4/5, awareness of discomfort   Iliospoas: 5/5 Iliospoas: 5/5   Hip extension:  4/5 Hip extension: 4-/5   PGM: 4-/5 PGM: 3+/5   Hip ER:  4+/5 Hip ER: 3+/5   Hip IR: 4+/5 Hip IR: 4-/5   Ankle dorsiflexion: 5/5 Ankle dorsiflexion: 5/5       Special Tests:  -SLR Test: negative   -Repeated Flexion: increased pain   -Repeated Ext: increased pain  -Slump Test: + bilaterally       Joint Mobility: normal lumbar segmental mobility, pain free      Palpation: tenderness at distal hamstring       Flexibility: " "  -Hamstring : R = no limitation ; L = no limitation      Liban Test Right  Left    Iliopsoas - -   Rectus Femoris  - +         Intake Outcome Measure for FOTO left thigh pain Survey    Therapist reviewed FOTO scores for Nimo Miller on 1/10/2025.   FOTO report - see Media section or FOTO account episode details.    Intake Score: 57%         Treatment     Total Treatment time (time-based codes) separate from Evaluation: 15 minutes     Nimo Barrera received the treatments listed below:      therapeutic exercises to develop strength, endurance, ROM, flexibility, posture, and core stabilization for 2 minutes including:  Adductor stretch 2x30" B    manual therapy techniques: Joint mobilizations, Manual traction, and Soft tissue Mobilization were applied for 0 minutes, including:      neuromuscular re-education activities to improve: Balance, Coordination, Kinesthetic, Sense, Proprioception, and Posture for 13 minutes. The following activities were included:  Posterior pelvic tilt 20x3"  Glute sets 20x3"  Supine sciatic nerve glides 2x10 B  Hamstring sets 20x3" B    Consider:  Bridges  Sidelying clamshells  Sidelying hip abductions  Swiss ball roll outs in small range of motion     therapeutic activities to improve functional performance for 0  minutes, including:        Patient Education and Home Exercises     Education provided:   - education on neural tension, evaluation findings    Written Home Exercises Provided: Yes. Exercises were reviewed and Nimo Barrera was able to demonstrate them prior to the end of the session.  Nimo Barrera demonstrated good  understanding of the education provided. See EMR under Patient Instructions for exercises provided during therapy sessions.    Assessment     Nimo Barrera is a 39 y.o. female referred to outpatient Physical Therapy with a medical diagnosis of Left thigh pain [M79.652] . Patient presents with acute onset hamstring pain following popping sensation when practicing splits in " November 2024. She presents with tenderness in the distal hamstring, lower extremity weakness, and mild pain with lumbar range of motion. She tolerated exercises well today and is a good candidate for skilled PT to address her deficits and improve ability to participate in hobbies as desired by patient.     Patient prognosis is Good.   Patient will benefit from skilled outpatient Physical Therapy to address the deficits stated above and in the chart below, provide patient /family education, and to maximize patientt's level of independence.     Plan of care discussed with patient: Yes  Patient's spiritual, cultural and educational needs considered and patient is agreeable to the plan of care and goals as stated below:     Anticipated Barriers for therapy: none    Medical Necessity is demonstrated by the following  History  Co-morbidities and personal factors that may impact the plan of care [x] LOW: no personal factors / co-morbidities  [] MODERATE: 1-2 personal factors / co-morbidities  [] HIGH: 3+ personal factors / co-morbidities    Moderate / High Support Documentation:   Co-morbidities affecting plan of care:     Personal Factors:   no deficits     Examination  Body Structures and Functions, activity limitations and participation restrictions that may impact the plan of care [x] LOW: addressing 1-2 elements  [] MODERATE: 3+ elements  [] HIGH: 4+ elements (please support below)    Moderate / High Support Documentation:      Clinical Presentation [x] LOW: stable  [] MODERATE: Evolving  [] HIGH: Unstable     Decision Making/ Complexity Score: low       GOALS: (not met, progressing unless otherwise specified)    Short Term Goals:  5 weeks  1.Report decreased left leg pain  < / =  4/10  to increase tolerance for stretching  2. Increase knee ROM to  in order to be able to perform ADLs without difficulty.  3. Increase strength by 1/3 MMT grade in bilateral lower extremity to increase tolerance for ADL and work  activities.  4. Pt to tolerate HEP to improve ROM and independence with ADL's    Long Term Goals: 10 weeks  1.Report decreased left leg pain < / = 2/10  to increase tolerance for participating in [employer]lesque  2.Patient to demonstrate negative neural tension testing   3.Increase strength to >/= 4/5 in bilateral lower extremity to increase tolerance for ADL and work activities.  4. Pt will report at CJ level (20-40% impaired) on FOTO knee to demonstrate increase in LE function with every day tasks.    Plan     Plan of care Certification: 1/10/2025 to 4/10/25.    Outpatient Physical Therapy 2 times weekly for 10 weeks to include the following interventions: Aquatic Therapy, Cervical/Lumbar Traction, Electrical Stimulation  , Gait Training, Manual Therapy, Moist Heat/ Ice, Neuromuscular Re-ed, Patient Education, Therapeutic Activities, Therapeutic Exercise, and Ultrasound.     Laura Bonilla, PT        Physician's Signature: _________________________________________ Date: ________________

## 2025-01-30 NOTE — PROGRESS NOTES
"[facility]   Physical Therapy Treatment Note      Name: Nimo Barrera [facility] Number: [id number]    Therapy Diagnosis:   Encounter Diagnoses   Name Primary?    Left thigh pain Yes    Impaired functional mobility, balance, gait, and endurance     Muscle weakness of extremity      Physician: Flakita Geronimo, *    Visit Date: 1/31/2025    Physician Orders: PT Eval and Treat   Medical Diagnosis from Referral: Left thigh pain [M79.652]   Evaluation Date: 1/10/2025  Authorization Period Expiration: 12/31/25  Plan of Care Expiration: 4/10/25  Progress Note Due: 2/10/25  Date of Surgery: N/A  Visit # / Visits authorized: 1/ 12   FOTO: 1/ 3     Precautions: Standard      Time In: 12:30  Time Out: 1:04  Total Billable Time: 34 minutes    PTA Visit #: 0/5       Subjective     Patient reports: did well with exercises for the first week, but was not feeling well the second week and could not do them. No overall change in symptoms  She was compliant with home exercise program.  Response to previous treatment: initial eval  Functional change: ongoing    Pain: 2/10  Location: left hmastring/sciatic      Objective      Objective Measures updated at progress report unless specified.     Treatment     Nimo Barrera received the treatments listed below:      therapeutic exercises to develop strength, endurance, ROM, flexibility, posture, and core stabilization for 11 minutes including:  Adductor stretch 2x30" B  +sidelying clamshells 2x10x3"   +prone hamstring curl 2x10   +seated hip internal rotation and external rotation x15 each B     manual therapy techniques: Joint mobilizations, Manual traction, and Soft tissue Mobilization were applied for 0 minutes, including:        neuromuscular re-education activities to improve: Balance, Coordination, Kinesthetic, Sense, Proprioception, and Posture for 23 minutes. The following activities were included:  Posterior pelvic tilt 20x3"  Glute sets 20x3"  Supine " "sciatic nerve glides 2x10 B  Hamstring sets 20x3" B  +DKTC with swiss ball for hamstring activation 2x10      Consider:  Bridges  Sidelying hip abductions     therapeutic activities to improve functional performance for 0  minutes, including:       Patient Education and Home Exercises       Education provided:   - updated HEP    Written Home Exercises Provided: Pt instructed to continue prior HEP. Exercises were reviewed and Nimo Barrera was able to demonstrate them prior to the end of the session.  Nimo Barrera demonstrated good  understanding of the education provided. See Electronic Medical Record under Patient Instructions for exercises provided during therapy sessions    Assessment     Nimo Barrera tolerated sessio very well despite feeling weakness and fatigue throughout the week as she recovered from illness. Added several new exercises which she demonstrates good form with min verbal cues. Will continue to progress hip and lower extremity strength within her tolerance to reduce symptoms and restore max function with [employer] dancing.     Nimo Barrera Is progressing well towards her goals.   Patient prognosis is Good.     Patient will continue to benefit from skilled outpatient physical therapy to address the deficits listed in the problem list box on initial evaluation, provide pt/family education and to maximize pt's level of independence in the home and community environment.     Patient's spiritual, cultural and educational needs considered and pt agreeable to plan of care and goals.     Anticipated barriers to physical therapy: none    Goals: (not met, progressing unless otherwise specified)     Short Term Goals:  5 weeks  1.Report decreased left leg pain  < / =  4/10  to increase tolerance for stretching  2. Increase knee ROM to  in order to be able to perform ADLs without difficulty.  3. Increase strength by 1/3 MMT grade in bilateral lower extremity to increase tolerance for ADL and work activities.  4. Pt to " tolerate HEP to improve ROM and independence with ADL's     Long Term Goals: 10 weeks  1.Report decreased left leg pain < / = 2/10  to increase tolerance for participating in burlesque  2.Patient to demonstrate negative neural tension testing   3.Increase strength to >/= 4/5 in bilateral lower extremity to increase tolerance for ADL and work activities.  4. Pt will report at CJ level (20-40% impaired) on FOTO knee to demonstrate increase in LE function with every day tasks.     Plan     Continue plan of care    Laura Bonilla, PT

## 2025-01-31 ENCOUNTER — CLINICAL SUPPORT (OUTPATIENT)
Dept: REHABILITATION | Facility: HOSPITAL | Age: 40
End: 2025-01-31
Payer: COMMERCIAL

## 2025-01-31 DIAGNOSIS — Z74.09 IMPAIRED FUNCTIONAL MOBILITY, BALANCE, GAIT, AND ENDURANCE: ICD-10-CM

## 2025-01-31 DIAGNOSIS — M79.652 LEFT THIGH PAIN: Primary | ICD-10-CM

## 2025-01-31 DIAGNOSIS — M62.81 MUSCLE WEAKNESS OF EXTREMITY: ICD-10-CM

## 2025-01-31 PROCEDURE — 97112 NEUROMUSCULAR REEDUCATION: CPT | Mod: PO

## 2025-01-31 PROCEDURE — 97110 THERAPEUTIC EXERCISES: CPT | Mod: PO

## 2025-02-02 NOTE — PROGRESS NOTES
"[facility]   Physical Therapy Treatment Note      Name: Nimo Barrera [facility] Number: [id number]    Therapy Diagnosis:   Encounter Diagnoses   Name Primary?    Left thigh pain Yes    Impaired functional mobility, balance, gait, and endurance     Muscle weakness of extremity        Physician: Flakita Geronimo, *    Visit Date: 2/3/2025    Physician Orders: PT Eval and Treat   Medical Diagnosis from Referral: Left thigh pain [M79.652]   Evaluation Date: 1/10/2025  Authorization Period Expiration: 12/31/25  Plan of Care Expiration: 4/10/25  Progress Note Due: 2/10/25  Date of Surgery: N/A  Visit # / Visits authorized: 2/ 12   FOTO: 2/ 3     Precautions: Standard      Time In: 2:02  Time Out: 2:43  Total Billable Time: 42 minutes    PTA Visit #: 0/5       Subjective     Patient reports: a little sore, but not too bad. Still feeling weak from her illness last week.   She was compliant with home exercise program.  Response to previous treatment: a little sore   Functional change: ongoing    Pain: 1-2/10  Location: left hamstring/sciatic      Objective      Objective Measures updated at progress report unless specified.       Treatment     Nimo Barrera received the treatments listed below:      therapeutic exercises to develop strength, endurance, ROM, flexibility, posture, and core stabilization for 23 minutes including:  Adductor stretch 2x30" B  +hamstring stretch 1x30" each   +bridges 2x10x3"  sidelying clamshells 2x10x3"   prone hamstring curl 2x10 B  seated hip internal rotation and external rotation x15 each B  +lateral band walks with red band at knees x2 laps at mirror      manual therapy techniques: Joint mobilizations, Manual traction, and Soft tissue Mobilization were applied for 0 minutes, including:        neuromuscular re-education activities to improve: Balance, Coordination, Kinesthetic, Sense, Proprioception, and Posture for 15 minutes. The following activities were " "included:  Posterior pelvic tilt 20x3"  Glute sets 20x3"  Supine sciatic nerve glides 2x10 B  Hamstring sets 20x3" B  DKTC with swiss ball for hamstring activation 2x10      Consider:  Bridges  Sidelying hip abductions     therapeutic activities to improve functional performance for 4  minutes, including:  +sled push/pull x1 lap       Patient Education and Home Exercises       Education provided:   - updated HEP    Written Home Exercises Provided: Pt instructed to continue prior HEP. Exercises were reviewed and Nimo Barrera was able to demonstrate them prior to the end of the session.  Nimo Barrera demonstrated good  understanding of the education provided. See Electronic Medical Record under Patient Instructions for exercises provided during therapy sessions    Assessment     Nimo Barrera did well in session today but does report feeling of pulling in the left hamstring with stretch today. Newly added exercises were will tolerated and will consider reverse lunge with TRX at next visit.     Nimo Barrera Is progressing well towards her goals.   Patient prognosis is Good.     Patient will continue to benefit from skilled outpatient physical therapy to address the deficits listed in the problem list box on initial evaluation, provide pt/family education and to maximize pt's level of independence in the home and community environment.     Patient's spiritual, cultural and educational needs considered and pt agreeable to plan of care and goals.     Anticipated barriers to physical therapy: none    Goals: (not met, progressing unless otherwise specified)     Short Term Goals:  5 weeks  1.Report decreased left leg pain  < / =  4/10  to increase tolerance for stretching  2. Increase knee ROM to  in order to be able to perform ADLs without difficulty.  3. Increase strength by 1/3 MMT grade in bilateral lower extremity to increase tolerance for ADL and work activities.  4. Pt to tolerate HEP to improve ROM and independence with ADL's   "   Long Term Goals: 10 weeks  1.Report decreased left leg pain < / = 2/10  to increase tolerance for participating in [employer]  2.Patient to demonstrate negative neural tension testing   3.Increase strength to >/= 4/5 in bilateral lower extremity to increase tolerance for ADL and work activities.  4. Pt will report at CJ level (20-40% impaired) on FOTO knee to demonstrate increase in LE function with every day tasks.     Plan     Continue plan of care    Laura Bonilla, PT

## 2025-02-03 ENCOUNTER — OFFICE VISIT (OUTPATIENT)
Dept: FAMILY MEDICINE | Facility: CLINIC | Age: 40
End: 2025-02-03
Attending: FAMILY MEDICINE
Payer: COMMERCIAL

## 2025-02-03 ENCOUNTER — CLINICAL SUPPORT (OUTPATIENT)
Dept: REHABILITATION | Facility: HOSPITAL | Age: 40
End: 2025-02-03
Payer: COMMERCIAL

## 2025-02-03 VITALS — HEIGHT: 64 IN | BODY MASS INDEX: 28.24 KG/M2 | WEIGHT: 165.38 LBS

## 2025-02-03 DIAGNOSIS — M79.652 LEFT THIGH PAIN: Primary | ICD-10-CM

## 2025-02-03 DIAGNOSIS — M62.81 MUSCLE WEAKNESS OF EXTREMITY: ICD-10-CM

## 2025-02-03 DIAGNOSIS — Z74.09 IMPAIRED FUNCTIONAL MOBILITY, BALANCE, GAIT, AND ENDURANCE: ICD-10-CM

## 2025-02-03 DIAGNOSIS — F32.A ANXIETY AND DEPRESSION: Primary | ICD-10-CM

## 2025-02-03 DIAGNOSIS — F41.9 ANXIETY AND DEPRESSION: Primary | ICD-10-CM

## 2025-02-03 PROCEDURE — 1160F RVW MEDS BY RX/DR IN RCRD: CPT | Mod: CPTII,95,, | Performed by: FAMILY MEDICINE

## 2025-02-03 PROCEDURE — 3008F BODY MASS INDEX DOCD: CPT | Mod: CPTII,95,, | Performed by: FAMILY MEDICINE

## 2025-02-03 PROCEDURE — 98005 SYNCH AUDIO-VIDEO EST LOW 20: CPT | Mod: 95,,, | Performed by: FAMILY MEDICINE

## 2025-02-03 PROCEDURE — 97112 NEUROMUSCULAR REEDUCATION: CPT | Mod: PO

## 2025-02-03 PROCEDURE — 1159F MED LIST DOCD IN RCRD: CPT | Mod: CPTII,95,, | Performed by: FAMILY MEDICINE

## 2025-02-03 PROCEDURE — 97110 THERAPEUTIC EXERCISES: CPT | Mod: PO

## 2025-02-03 RX ORDER — SERTRALINE HYDROCHLORIDE 100 MG/1
200 TABLET, FILM COATED ORAL DAILY
Qty: 180 TABLET | Refills: 3 | Status: SHIPPED | OUTPATIENT
Start: 2025-02-03

## 2025-02-03 NOTE — PROGRESS NOTES
The patient location is: home  The chief complaint leading to consultation is: depression    Visit type: audiovisual    Face to Face time with patient: 15  20 minutes of total time spent on the encounter, which includes face to face time and non-face to face time preparing to see the patient (eg, review of tests), Obtaining and/or reviewing separately obtained history, Documenting clinical information in the electronic or other health record, Independently interpreting results (not separately reported) and communicating results to the patient/family/caregiver, or Care coordination (not separately reported).         Each patient to whom he or she provides medical services by telemedicine is:  (1) informed of the relationship between the physician and patient and the respective role of any other health care provider with respect to management of the patient; and (2) notified that he or she may decline to receive medical services by telemedicine and may withdraw from such care at any time.    Notes:   Subjective:       Patient ID: Nimo Miller is a 40 y.o. female.    Chief Complaint: depression anxiety  DepressionPatient is not experiencing: confusion and palpitations.        Pt is in virtual for follow up of anxiety depression no si/hi no panic attacks pt is followed in psych however she would like to get her zoloft from me   Review of Systems   Constitutional:  Negative for activity change, chills, fatigue, fever and unexpected weight change.   HENT:  Negative for hearing loss, rhinorrhea and trouble swallowing.    Eyes:  Negative for discharge and visual disturbance.   Respiratory:  Negative for chest tightness and wheezing.    Cardiovascular:  Negative for chest pain and palpitations.   Gastrointestinal:  Negative for blood in stool, constipation, diarrhea and vomiting.   Endocrine: Negative for polydipsia and polyuria.   Genitourinary:  Negative for difficulty urinating, dysuria, hematuria and menstrual  "problem.   Musculoskeletal:  Negative for arthralgias, joint swelling and neck pain.   Neurological:  Negative for weakness and headaches.   Psychiatric/Behavioral:  Positive for depression. Negative for confusion and dysphoric mood.        Objective:    Ht 5' 4" (1.626 m)   Wt 75 kg (165 lb 5.5 oz)   LMP 10/28/2022   BMI 28.38 kg/m²     Physical Exam  Constitutional:       Appearance: Normal appearance. She is not ill-appearing.   Pulmonary:      Effort: Pulmonary effort is normal. No respiratory distress.   Neurological:      General: No focal deficit present.      Mental Status: She is alert and oriented to person, place, and time.      Cranial Nerves: No cranial nerve deficit.      Coordination: Coordination normal.   Psychiatric:         Mood and Affect: Mood normal.         Behavior: Behavior normal.         Thought Content: Thought content normal.         Judgment: Judgment normal.       Tsh 1.6 in 4/2024  Assessment:       1. Anxiety and depression        Plan:     Orders declined  Cont meds  Avoid caffeine and etoh  Relaxation techniques  Graded exercise  Rtc annually and prn  F/u psych       "This note will not be shared with the patient."     "

## 2025-02-10 ENCOUNTER — CLINICAL SUPPORT (OUTPATIENT)
Dept: REHABILITATION | Facility: HOSPITAL | Age: 40
End: 2025-02-10
Payer: COMMERCIAL

## 2025-02-10 DIAGNOSIS — M62.81 MUSCLE WEAKNESS OF EXTREMITY: ICD-10-CM

## 2025-02-10 DIAGNOSIS — Z74.09 IMPAIRED FUNCTIONAL MOBILITY, BALANCE, GAIT, AND ENDURANCE: ICD-10-CM

## 2025-02-10 DIAGNOSIS — M79.652 LEFT THIGH PAIN: Primary | ICD-10-CM

## 2025-02-10 PROCEDURE — 97110 THERAPEUTIC EXERCISES: CPT | Mod: PO

## 2025-02-10 PROCEDURE — 97112 NEUROMUSCULAR REEDUCATION: CPT | Mod: PO

## 2025-02-10 NOTE — PROGRESS NOTES
"[facility]   Physical Therapy Treatment Note      Name: Nimo Barrera [place]  Clinic Number: [id number]    Therapy Diagnosis:   Encounter Diagnoses   Name Primary?    Left thigh pain Yes    Impaired functional mobility, balance, gait, and endurance     Muscle weakness of extremity          Physician: Flakita Geronimo, *    Visit Date: 2/10/2025    Physician Orders: PT Eval and Treat   Medical Diagnosis from Referral: Left thigh pain [M79.652]   Evaluation Date: 1/10/2025  Authorization Period Expiration: 12/31/25  Plan of Care Expiration: 4/10/25  Progress Note Due: 2/10/25  Date of Surgery: N/A  Visit # / Visits authorized: 3/ 12   FOTO: 2/ 3     Precautions: Standard      Time In: 2:00  Time Out: 2:46  Total Billable Time: 46 minutes    PTA Visit #: 0/5       Subjective     Patient reports: was able to do the exercises 3 days last week and could touch her toes when in the shower.   She was compliant with home exercise program.  Response to previous treatment: a little sore   Functional change: ongoing    Pain: 1-2/10  Location: left hamstring/sciatic      Objective      Objective Measures updated at progress report unless specified.       Treatment     Nimo Barrera received the treatments listed below:      therapeutic exercises to develop strength, endurance, ROM, flexibility, posture, and core stabilization for 23 minutes including:  Adductor stretch 2x30" B  hamstring stretch 1x30" each   bridges 2x10x3"  sidelying clamshells 2x10x3" B  prone hamstring curl +1# 2x10 B  seated hip internal rotation and external rotation +1# x15 each B  lateral band walks with red band at knees x2 laps at mirror   +paloff press with 2 red bands 2x10 B   +upright bike x5 min Level 2     manual therapy techniques: Joint mobilizations, Manual traction, and Soft tissue Mobilization were applied for 0 minutes, including:        neuromuscular re-education activities to improve: Balance, Coordination, " "Kinesthetic, Sense, Proprioception, and Posture for 23 minutes. The following activities were included:  Posterior pelvic tilt 20x3"  Glute sets 20x3"  Supine sciatic nerve glides 2x10 B  Hamstring sets 20x3" B  DKTC with swiss ball for hamstring activation 2x10   +single limb squat with furniture slider and yellow dowel 2x5      Consider:  Sidelying hip abductions     therapeutic activities to improve functional performance for 0 minutes, including:  sled push/pull x1 lap       Patient Education and Home Exercises       Education provided:   - updated HEP    Written Home Exercises Provided: Pt instructed to continue prior HEP. Exercises were reviewed and Nimo Barrera was able to demonstrate them prior to the end of the session.  Nimo Barrera demonstrated good  understanding of the education provided. See Electronic Medical Record under Patient Instructions for exercises provided during therapy sessions    Assessment     Nimo Barrera presents today with reports of compliance with HEP and ability to bend forward to touch toes when in the shower. She was able to perform exercises as noted above with some progressions as indicated by + signs. She will benefit from continued progression with focus on single limb and glute strengthing activity such as reverse lunges and step ups.     Nimo Barrera Is progressing well towards her goals.   Patient prognosis is Good.     Patient will continue to benefit from skilled outpatient physical therapy to address the deficits listed in the problem list box on initial evaluation, provide pt/family education and to maximize pt's level of independence in the home and community environment.     Patient's spiritual, cultural and educational needs considered and pt agreeable to plan of care and goals.     Anticipated barriers to physical therapy: none    Goals: (not met, progressing unless otherwise specified)     Short Term Goals:  5 weeks  1.Report decreased left leg pain  < / =  4/10  to increase " tolerance for stretching  2. Increase knee ROM to  in order to be able to perform ADLs without difficulty.  3. Increase strength by 1/3 MMT grade in bilateral lower extremity to increase tolerance for ADL and work activities.  4. Pt to tolerate HEP to improve ROM and independence with ADL's     Long Term Goals: 10 weeks  1.Report decreased left leg pain < / = 2/10  to increase tolerance for participating in burlesque  2.Patient to demonstrate negative neural tension testing   3.Increase strength to >/= 4/5 in bilateral lower extremity to increase tolerance for ADL and work activities.  4. Pt will report at CJ level (20-40% impaired) on FOTO knee to demonstrate increase in LE function with every day tasks.     Plan     Continue plan of care    Laura Bonilla, PT

## 2025-02-14 NOTE — PROGRESS NOTES
[facility]   Physical Therapy Treatment Note     Name: Nimo Miller  [facility] Number: [id number]    Therapy Diagnosis:   Encounter Diagnoses   Name Primary?    Left thigh pain Yes    Impaired functional mobility, balance, gait, and endurance     Muscle weakness of extremity            Physician: Flakita Geronimo, *    Visit Date: 2/17/2025    Physician Orders: PT Eval and Treat   Medical Diagnosis from Referral: Left thigh pain [M79.652]   Evaluation Date: 1/10/2025  Authorization Period Expiration: 12/31/25  Plan of Care Expiration: 4/10/25  Progress Note Due: DUE NEXT VISIT!  Date of Surgery: N/A  Visit # / Visits authorized: 4/ 12   FOTO: 2/ 3     Precautions: Standard      Time In: 2:00  Time Out: 2:48  Total Billable Time: 48 minutes    PTA Visit #: 0/5       Subjective     Patient reports: doing pretty good. Did the exercises almost every day. She is having a little soreness, but thinks this is from a workout she did outside of PT. She is having more movement in the hamstring functionally.   She was compliant with home exercise program.  Response to previous treatment: a little sore   Functional change: more flexibility/movement     Pain: 2/10  Location: left hamstring/sciatic      Objective      Posture:  WFL     Lumbar Range of Motion:     Limitations Pain   Flexion 0%    no         Extension 0%    No          Left Side Bending 0% no         Right Side Bending 0% no            Right rotation: pain in left side  Rotation range of motion is full     Lower Extremity Strength  Right LE   Left LE     Quadriceps: 5/5 Quadriceps: 5/5   Hamstrings: 4+/5 Hamstrings: 4/5, awareness of discomfort   Iliospoas: 5/5 Iliospoas: 5/5   Hip extension:  4/5 Hip extension: 4-/5   PGM: 4-/5 PGM: 3+/5   Hip ER:  4+/5 Hip ER: 3+/5   Hip IR: 4+/5 Hip IR: 4-/5   Ankle dorsiflexion: 5/5 Ankle dorsiflexion: 5/5         Special Tests:  -SLR Test: negative   -Repeated Flexion: increased pain  "  -Repeated Ext: increased pain  -Slump Test: + bilaterally         Joint Mobility: normal lumbar segmental mobility, pain free        Palpation: tenderness at distal hamstring         Flexibility:   -Hamstring : R = no limitation ; L = no limitation        Liban Test Right  Left    Iliopsoas - -   Rectus Femoris  - +            Intake Outcome Measure for FOTO left thigh pain Survey     Therapist reviewed FOTO scores for Nimo Miller on 1/10/2025.   FOTO report - see Media section or FOTO account episode details.     Intake Score: 57%             Treatment     Nimo Barrera received the treatments listed below:    Bold=performed  therapeutic exercises to develop strength, endurance, ROM, flexibility, posture, and core stabilization for 32 minutes including:  Adductor stretch 2x30" B  hamstring stretch 1x30" each   bridges +with kick 2x10  sidelying clamshells +red band 2x10x3" B  prone hamstring curl 1# 2x10 B  seated hip internal rotation and external rotation 1# x15 each B  +seated hamstring curls with red band 2x10 B   lateral band walks with red band at knees x2 laps at mirror   paloff press with 2 red bands 2x10 B   upright bike x5 min Level 2     manual therapy techniques: Joint mobilizations, Manual traction, and Soft tissue Mobilization were applied for 0 minutes, including:        neuromuscular re-education activities to improve: Balance, Coordination, Kinesthetic, Sense, Proprioception, and Posture for 16 minutes. The following activities were included:  Posterior pelvic tilt 20x3"  Glute sets 20x3"  Supine sciatic nerve glides 2x10 B  Hamstring sets 20x3" B  DKTC with swiss ball for hamstring activation 2x10   single limb squat with furniture slider and yellow dowel 2x5   +hip hinge with dowel 2x10      Consider:  Sidelying hip abductions     therapeutic activities to improve functional performance for 0 minutes, including:  sled push/pull x1 lap       Patient Education and Home Exercises   "     Education provided:   - updated HEP    Written Home Exercises Provided: Pt instructed to continue prior HEP. Exercises were reviewed and Nimo Barrera was able to demonstrate them prior to the end of the session.  Nimo Barrera demonstrated good  understanding of the education provided. See Electronic Medical Record under Patient Instructions for exercises provided during therapy sessions    Assessment     Nimo Barrera tolerates session today very well and reports some increase in fatigue, but not pain with any exercises. Added seated hamstring curls and hip hinge to introduce hamstring loading. Will continue to progress as tolerated and complete formal reassessment at next visit.      Nimo Barrera Is progressing well towards her goals.   Patient prognosis is Good.     Patient will continue to benefit from skilled outpatient physical therapy to address the deficits listed in the problem list box on initial evaluation, provide pt/family education and to maximize pt's level of independence in the home and community environment.     Patient's spiritual, cultural and educational needs considered and pt agreeable to plan of care and goals.     Anticipated barriers to physical therapy: none    Goals: (not met, progressing unless otherwise specified)     Short Term Goals:  5 weeks  1.Report decreased left leg pain  < / =  4/10  to increase tolerance for stretching  2. Increase knee ROM to  in order to be able to perform ADLs without difficulty.  3. Increase strength by 1/3 MMT grade in bilateral lower extremity to increase tolerance for ADL and work activities.  4. Pt to tolerate HEP to improve ROM and independence with ADL's     Long Term Goals: 10 weeks  1.Report decreased left leg pain < / = 2/10  to increase tolerance for participating in burlesque  2.Patient to demonstrate negative neural tension testing   3.Increase strength to >/= 4/5 in bilateral lower extremity to increase tolerance for ADL and work activities.  4. Pt will  report at CJ level (20-40% impaired) on FOTO knee to demonstrate increase in LE function with every day tasks.     Plan     Continue plan of care    Laura Bnoilla, PT, DPT

## 2025-02-17 ENCOUNTER — CLINICAL SUPPORT (OUTPATIENT)
Dept: REHABILITATION | Facility: HOSPITAL | Age: 40
End: 2025-02-17
Payer: COMMERCIAL

## 2025-02-17 DIAGNOSIS — Z74.09 IMPAIRED FUNCTIONAL MOBILITY, BALANCE, GAIT, AND ENDURANCE: ICD-10-CM

## 2025-02-17 DIAGNOSIS — M79.652 LEFT THIGH PAIN: Primary | ICD-10-CM

## 2025-02-17 DIAGNOSIS — M62.81 MUSCLE WEAKNESS OF EXTREMITY: ICD-10-CM

## 2025-02-17 PROCEDURE — 97112 NEUROMUSCULAR REEDUCATION: CPT | Mod: PO

## 2025-02-17 PROCEDURE — 97110 THERAPEUTIC EXERCISES: CPT | Mod: PO

## 2025-02-18 ENCOUNTER — PATIENT MESSAGE (OUTPATIENT)
Facility: CLINIC | Age: 40
End: 2025-02-18
Payer: COMMERCIAL

## 2025-02-21 NOTE — PROGRESS NOTES
[facility]   Physical Therapy Treatment Note/ Reassessment     Name: Nimo Miller  Clinic Number: [id number]    Therapy Diagnosis:   Encounter Diagnoses   Name Primary?    Left thigh pain Yes    Impaired functional mobility, balance, gait, and endurance     Muscle weakness of extremity              Physician: Flakita Geronimo, *    Visit Date: 2/24/2025    Physician Orders: PT Eval and Treat   Medical Diagnosis from Referral: Left thigh pain [M79.652]   Evaluation Date: 1/10/2025  Authorization Period Expiration: 12/31/25  Plan of Care Expiration: 4/10/25  Progress Note Due: 3/24/25  Date of Surgery: N/A  Visit # / Visits authorized: 5/ 12   FOTO: 2/ 3     Precautions: Standard      Time In: 2:00  Time Out: 2:49  Total Billable Time: 49 minutes    PTA Visit #: 0/5       Subjective     Patient reports: a little tired after last visit. Had a show last week so she did a lot of stretching.   She was compliant with home exercise program.  Response to previous treatment: a little sore   Functional change: more flexibility/movement     Pain: 1/10  Location: left hamstring/sciatic      Objective      Posture:  WFL     Lumbar Range of Motion:     Limitations Pain   Flexion 0%    no         Extension 0%    No          Left Side Bending 0% no         Right Side Bending 0% no            Right rotation: pain in left side  Rotation range of motion is full     Lower Extremity Strength  Right LE   Left LE     Quadriceps: 5/5 Quadriceps: 5/5   Hamstrings: 4+/5 Hamstrings: 4+/5   Iliospoas: 5/5 Iliospoas: 5/5   Hip extension:  4/5 Hip extension: 4-/5   PGM: 4+/5 PGM: 4+/5   Hip ER:  5/5 Hip ER: 4-/5   Hip IR: 4+/5 Hip IR: 4-/5   Ankle dorsiflexion: 5/5 Ankle dorsiflexion: 5/5         Special Tests:  -SLR Test: negative   -Repeated Flexion: increased pain   -Repeated Ext: increased pain  -Slump Test: - bilaterally (just feels a stretch, greater on the left than the right)        Joint Mobility:  "normal lumbar segmental mobility, pain free        Palpation: tenderness at distal hamstring         Flexibility:   -Hamstring : R = no limitation ; L = no limitation        Liban Test Right  Left    Iliopsoas - -   Rectus Femoris  - +            Intake Outcome Measure for FOTO left thigh pain Survey     Therapist reviewed FOTO scores for Nimo Miller on 1/10/2025.   FOTO report - see Media section or FOTO account episode details.     Intake Score: 57%             Treatment     Nimo Barrera received the treatments listed below:    Bold=performed  therapeutic exercises to develop strength, endurance, ROM, flexibility, posture, and core stabilization for 33 minutes including:  Adductor stretch 2x30" B  hamstring stretch 1x30" each   bridges with kick 2x10  sidelying clamshells red band 2x10x3" B  +sidelying hip abduction 2x10 B   prone hamstring curl 1# 2x10 B  seated hip internal rotation and external rotation 1# x15 each B  seated hamstring curls with red band 2x10 B   lateral band walks with red band at knees x2 laps at mirror   paloff press with 2 red bands 2x10 B   +reverse lunge with TRX x15 B    upright bike x5 min Level 2  measurements     manual therapy techniques: Joint mobilizations, Manual traction, and Soft tissue Mobilization were applied for 0 minutes, including:        neuromuscular re-education activities to improve: Balance, Coordination, Kinesthetic, Sense, Proprioception, and Posture for 16 minutes. The following activities were included:  Posterior pelvic tilt 20x3"  Glute sets 20x3"  Supine sciatic nerve glides 2x10 B  Hamstring sets 20x3" B  DKTC with swiss ball for hamstring activation 2x10   single limb squat with furniture slider and yellow dowel 2x5   hip hinge with dowel 2x10      Consider:  Sidelying hip abductions     therapeutic activities to improve functional performance for 0 minutes, including:  sled push/pull x1 lap       Patient Education and Home Exercises       Education " provided:   - updated HEP    Written Home Exercises Provided: Pt instructed to continue prior HEP. Exercises were reviewed and Nimo Barrera was able to demonstrate them prior to the end of the session.  Nimo Barrera demonstrated good  understanding of the education provided. See Electronic Medical Record under Patient Instructions for exercises provided during therapy sessions    Assessment     Nimo Barrera presents today reporting that she did well with her burlesque show, but can still feel sharpness in the hamstring with forward flexion movements. Strength wise, she can feel improvements as well. Reassessment reveals negative neural tension testing and good improvements in bilateral lower extremity strength. Will continue to progress as tolerated with focus on controlled hamstring loading.     Nimo Barrera Is progressing well towards her goals.   Patient prognosis is Good.     Patient will continue to benefit from skilled outpatient physical therapy to address the deficits listed in the problem list box on initial evaluation, provide pt/family education and to maximize pt's level of independence in the home and community environment.     Patient's spiritual, cultural and educational needs considered and pt agreeable to plan of care and goals.     Anticipated barriers to physical therapy: none    Goals: (not met, progressing unless otherwise specified)     Short Term Goals:  5 weeks  1.Report decreased left leg pain  < / =  4/10  to increase tolerance for stretching  2. Increase knee ROM to  in order to be able to perform ADLs without difficulty.  3. Increase strength by 1/3 MMT grade in bilateral lower extremity to increase tolerance for ADL and work activities.  4. Pt to tolerate HEP to improve ROM and independence with ADL's     Long Term Goals: 10 weeks  1.Report decreased left leg pain < / = 2/10  to increase tolerance for participating in burlesque  2.Patient to demonstrate negative neural tension testing   3.Increase  strength to >/= 4/5 in bilateral lower extremity to increase tolerance for ADL and work activities.  4. Pt will report at CJ level (20-40% impaired) on FOTO knee to demonstrate increase in LE function with every day tasks.     Plan     Continue plan of care    Laura Bonilla, PT, DPT

## 2025-02-24 ENCOUNTER — CLINICAL SUPPORT (OUTPATIENT)
Dept: REHABILITATION | Facility: HOSPITAL | Age: 40
End: 2025-02-24
Payer: COMMERCIAL

## 2025-02-24 ENCOUNTER — PATIENT MESSAGE (OUTPATIENT)
Dept: FAMILY MEDICINE | Facility: CLINIC | Age: 40
End: 2025-02-24
Payer: COMMERCIAL

## 2025-02-24 DIAGNOSIS — M79.652 LEFT THIGH PAIN: Primary | ICD-10-CM

## 2025-02-24 DIAGNOSIS — M62.81 MUSCLE WEAKNESS OF EXTREMITY: ICD-10-CM

## 2025-02-24 DIAGNOSIS — Z74.09 IMPAIRED FUNCTIONAL MOBILITY, BALANCE, GAIT, AND ENDURANCE: ICD-10-CM

## 2025-02-24 PROCEDURE — 97110 THERAPEUTIC EXERCISES: CPT | Mod: PO

## 2025-02-24 PROCEDURE — 97112 NEUROMUSCULAR REEDUCATION: CPT | Mod: PO

## 2025-03-06 DIAGNOSIS — F32.A ANXIETY AND DEPRESSION: Primary | ICD-10-CM

## 2025-03-06 DIAGNOSIS — F41.9 ANXIETY AND DEPRESSION: Primary | ICD-10-CM

## 2025-03-07 NOTE — PROGRESS NOTES
"[facility]   Physical Therapy Treatment Note     Name: Nimo Barrera [facility] Number: [id number]    Therapy Diagnosis:   No diagnosis found.            Physician: Flakita Geronimo, *    Visit Date: 3/10/2025    Physician Orders: PT Ashleyal and Treat   Medical Diagnosis from Referral: Left thigh pain [M79.652]   Evaluation Date: 1/10/2025  Authorization Period Expiration: 12/31/25  Plan of Care Expiration: 4/10/25  Progress Note Due: 3/24/25  Date of Surgery: N/A  Visit # / Visits authorized: 6/ 12   FOTO: 2/ 3     Precautions: Standard      Time In: 10:00  Time Out: 10:52  Total Billable Time: 52 minutes    PTA Visit #: 0/5       Subjective     Patient reports: took the [place] to a [place] this weekend that had a [place]. There was a little hill that she went up and down a few times so she feels a little inferior glute pain.   She was compliant with home exercise program.  Response to previous treatment: a little sore   Functional change: more flexibility/movement     Pain: 1-2/10  Location: left hamstring/sciatic      Objective      Objective measures taken at progress report unless specified otherwise.     FOTO: 67% on 3/10/25    Treatment     Nimo Barrera received the treatments listed below:    Bold=performed  therapeutic exercises to develop strength, endurance, ROM, flexibility, posture, and core stabilization for 28 minutes including:  Adductor stretch 2x30" B  hamstring stretch 1x30" each   Single limb bridges 1x15 B   sidelying clamshells red band 2x10x3" B  sidelying hip abduction 2x10 B   prone hamstring curl in mid range with 10# dumbbell between feet 2x10   seated hip internal rotation and external rotation 1# x15 each B  seated hamstring curls with red band 2x10 B   lateral band walks with red band at knees x2 laps at mirror   paloff press with 2 red bands 2x10 B   reverse lunge with TRX x15 B    upright bike x5 min Level 2  Measurements    Consider: hip thrust     manual therapy " "techniques: Joint mobilizations, Manual traction, and Soft tissue Mobilization were applied for 0 minutes, including:        neuromuscular re-education activities to improve: Balance, Coordination, Kinesthetic, Sense, Proprioception, and Posture for 24 minutes. The following activities were included:  Posterior pelvic tilt 20x3"  Glute sets 20x3"  Supine sciatic nerve glides 2x10 B  Hamstring sets 20x3" B  DKTC with swiss ball for hamstring activation 2x10   single limb squat with furniture slider and yellow dowel 2x5   hip hinge with dowel 2x10   +single limb hip hinge to wall with dowel for balance x10 B      Consider:  Sidelying hip abductions     therapeutic activities to improve functional performance for 0 minutes, including:  sled push/pull x1 lap       Patient Education and Home Exercises       Education provided:   - updated HEP    Written Home Exercises Provided: Pt instructed to continue prior HEP + add hamstring curls. Exercises were reviewed and Nimo Barrera was able to demonstrate them prior to the end of the session.  Nimo Barrera demonstrated good  understanding of the education provided. See Electronic Medical Record under Patient Instructions for exercises provided during therapy sessions    Access Code: [email or web address]  URL: [email or web address]  Date: 03/10/2025  Prepared by: Laura Bonilla    Exercises  - Prone Hamstring Curl with Anchored Resistance  - 1 x daily - 2 sets - 10 reps    Assessment     Nimo Barrera did well with newly added hamstring loaded activities and was given a printout of prone hamstring curl to add to HEP. Verbal instructions given to only add this exercise if no pain or discomfort following todays session. She did very well with progression to single limb bridge today. Will continue to progress within her tolerance.     Nimo Barrera Is progressing well towards her goals.   Patient prognosis is Good.     Patient will continue to benefit from skilled outpatient physical therapy to " address the deficits listed in the problem list box on initial evaluation, provide pt/family education and to maximize pt's level of independence in the home and community environment.     Patient's spiritual, cultural and educational needs considered and pt agreeable to plan of care and goals.     Anticipated barriers to physical therapy: none    Goals: (not met, progressing unless otherwise specified)     Short Term Goals:  5 weeks  1.Report decreased left leg pain  < / =  4/10  to increase tolerance for stretching  2. Increase knee ROM to  in order to be able to perform ADLs without difficulty.  3. Increase strength by 1/3 MMT grade in bilateral lower extremity to increase tolerance for ADL and work activities.  4. Pt to tolerate HEP to improve ROM and independence with ADL's     Long Term Goals: 10 weeks  1.Report decreased left leg pain < / = 2/10  to increase tolerance for participating in burlesque  2.Patient to demonstrate negative neural tension testing   3.Increase strength to >/= 4/5 in bilateral lower extremity to increase tolerance for ADL and work activities.  4. Pt will report at CJ level (20-40% impaired) on FOTO knee to demonstrate increase in LE function with every day tasks.     Plan     Continue plan of care    Laura Bonilla, PT, DPT

## 2025-03-10 ENCOUNTER — CLINICAL SUPPORT (OUTPATIENT)
Dept: REHABILITATION | Facility: HOSPITAL | Age: 40
End: 2025-03-10
Payer: COMMERCIAL

## 2025-03-10 DIAGNOSIS — M79.652 LEFT THIGH PAIN: Primary | ICD-10-CM

## 2025-03-10 DIAGNOSIS — M62.81 MUSCLE WEAKNESS OF EXTREMITY: ICD-10-CM

## 2025-03-10 DIAGNOSIS — Z74.09 IMPAIRED FUNCTIONAL MOBILITY, BALANCE, GAIT, AND ENDURANCE: ICD-10-CM

## 2025-03-10 PROCEDURE — 97110 THERAPEUTIC EXERCISES: CPT | Mod: PO

## 2025-03-10 PROCEDURE — 97112 NEUROMUSCULAR REEDUCATION: CPT | Mod: PO

## 2025-03-14 NOTE — PROGRESS NOTES
"[facility]   Physical Therapy Treatment Note     Name: Nimo Barrera [facility] Number: [id number]    Therapy Diagnosis:   Encounter Diagnoses   Name Primary?    Left thigh pain Yes    Impaired functional mobility, balance, gait, and endurance     Muscle weakness of extremity                Physician: Flakita Geronimo, *    Visit Date: 3/17/2025    Physician Orders: PT Eval and Treat   Medical Diagnosis from Referral: Left thigh pain [M79.652]   Evaluation Date: 1/10/2025  Authorization Period Expiration: 12/31/25  Plan of Care Expiration: 4/10/25  Progress Note Due: 3/24/25  Date of Surgery: N/A  Visit # / Visits authorized: 7/ 12   FOTO: 2/ 3     Precautions: Standard      Time In: 9:00  Time Out: 9:38  Total Billable Time: 38 minutes    PTA Visit #: 0/5       Subjective     Patient reports: can tell she is getting stronger, but can still really feel the pain at the ischial tuberosity   She was compliant with home exercise program.  Response to previous treatment: a little sore   Functional change: more flexibility/movement     Pain: 1/10  Location: left hamstring/sciatic      Objective      Objective measures taken at progress report unless specified otherwise.     FOTO: 67% on 3/10/25    Treatment     Nimo Barrera received the treatments listed below:    Bold=performed  therapeutic exercises to develop strength, endurance, ROM, flexibility, posture, and core stabilization for 15 minutes including:  Adductor stretch 2x30" B  hamstring stretch 1x30" each   Single limb bridges 1x15 B   sidelying clamshells red band 2x10x3" B  sidelying hip abduction 2x10 B   prone hamstring curl in mid range with 10# dumbbell between feet 2x10   seated hip internal rotation and external rotation 1# x15 each B  seated hamstring curls with red band 2x10 B   lateral band walks with red band at knees x2 laps at mirror   paloff press with 2 red bands 2x10 B   reverse lunge with TRX x15 B    upright bike x5 " "min Level 2  Measurements    Consider: hip thrust     manual therapy techniques: Joint mobilizations, Manual traction, and Soft tissue Mobilization were applied for 0 minutes, including:        neuromuscular re-education activities to improve: Balance, Coordination, Kinesthetic, Sense, Proprioception, and Posture for 23 minutes. The following activities were included:  Posterior pelvic tilt 20x3"  Glute sets 20x3"  Supine sciatic nerve glides 2x10 B  Hamstring sets 20x3" B  DKTC with swiss ball for hamstring activation 2x10  +hamstring curls (in bridge position) on swiss ball 2x10   single limb squat with furniture slider and yellow dowel 2x5   hip hinge with dowel 2x10   +sled push/pull 25# x2 laps  +SLS on blue airex 2x30" B      Consider:  Hops to single limb, lateral bounding     therapeutic activities to improve functional performance for 0 minutes, including:  sled push/pull x1 lap       Patient Education and Home Exercises       Education provided:   - updated HEP    Written Home Exercises Provided: Pt instructed to continue prior HEP + add hamstring curls. Exercises were reviewed and Nimo Barrera was able to demonstrate them prior to the end of the session.  Nimo Barrera demonstrated good  understanding of the education provided. See Electronic Medical Record under Patient Instructions for exercises provided during therapy sessions    Access Code: [email or web address]  URL: [email or web address]  Date: 03/10/2025  Prepared by: Laura Bonilla    Exercises  - Prone Hamstring Curl with Anchored Resistance  - 1 x daily - 2 sets - 10 reps    Assessment     Nimo Barrera is doing very well with exercise with good challenge noted. She does report some pain with short bouts of running with her children, mostly on heel strike. Will add single limb hopping activities and additional balance activity to address this at next visit. Continue to progress.     Nimo Barrera Is progressing well towards her goals.   Patient prognosis is Good. "     Patient will continue to benefit from skilled outpatient physical therapy to address the deficits listed in the problem list box on initial evaluation, provide pt/family education and to maximize pt's level of independence in the home and community environment.     Patient's spiritual, cultural and educational needs considered and pt agreeable to plan of care and goals.     Anticipated barriers to physical therapy: none    Goals: (not met, progressing unless otherwise specified)     Short Term Goals:  5 weeks  1.Report decreased left leg pain  < / =  4/10  to increase tolerance for stretching  2. Increase knee ROM to  in order to be able to perform ADLs without difficulty.  3. Increase strength by 1/3 MMT grade in bilateral lower extremity to increase tolerance for ADL and work activities.  4. Pt to tolerate HEP to improve ROM and independence with ADL's     Long Term Goals: 10 weeks  1.Report decreased left leg pain < / = 2/10  to increase tolerance for participating in burlesque  2.Patient to demonstrate negative neural tension testing   3.Increase strength to >/= 4/5 in bilateral lower extremity to increase tolerance for ADL and work activities.  4. Pt will report at CJ level (20-40% impaired) on FOTO knee to demonstrate increase in LE function with every day tasks.     Plan     Continue plan of care    Laura Bonilla, PT, DPT

## 2025-03-17 ENCOUNTER — CLINICAL SUPPORT (OUTPATIENT)
Dept: REHABILITATION | Facility: HOSPITAL | Age: 40
End: 2025-03-17
Payer: COMMERCIAL

## 2025-03-17 DIAGNOSIS — M79.652 LEFT THIGH PAIN: Primary | ICD-10-CM

## 2025-03-17 DIAGNOSIS — M62.81 MUSCLE WEAKNESS OF EXTREMITY: ICD-10-CM

## 2025-03-17 DIAGNOSIS — Z74.09 IMPAIRED FUNCTIONAL MOBILITY, BALANCE, GAIT, AND ENDURANCE: ICD-10-CM

## 2025-03-17 PROCEDURE — 97112 NEUROMUSCULAR REEDUCATION: CPT | Mod: PO

## 2025-03-17 PROCEDURE — 97110 THERAPEUTIC EXERCISES: CPT | Mod: PO

## 2025-03-27 NOTE — PROGRESS NOTES
[facility]   Physical Therapy Treatment Note     Name: Nimo Miller  [facility] Number: [id number]    Therapy Diagnosis:   Encounter Diagnoses   Name Primary?    Left thigh pain Yes    Impaired functional mobility, balance, gait, and endurance     Muscle weakness of extremity                  Physician: Flakita Geronimo, *    Visit Date: 3/28/2025    Physician Orders: PT Eval and Treat   Medical Diagnosis from Referral: Left thigh pain [M79.652]   Evaluation Date: 1/10/2025  Authorization Period Expiration: 12/31/25  Plan of Care Expiration: 4/10/25  Progress Note Due: 4/28/25  Date of Surgery: N/A  Visit # / Visits authorized: 8/ 12   FOTO: 2/ 3     Precautions: Standard      Time In: 9:30  Time Out: 10:10  Total Billable Time: 40 minutes    PTA Visit #: 0/5       Subjective     Patient reports: no new pain or anything different, can still feel the pain at the ischial tuberosity  She was compliant with home exercise program.  Response to previous treatment: a little sore   Functional change: more flexibility/movement     Pain: 1/10  Location: left hamstring/sciatic      Objective        Posture:  WFL     Lumbar Range of Motion:     Limitations Pain   Flexion 0%    no         Extension 0%    No          Left Side Bending 0% no         Right Side Bending 0% no            Right rotation: pain in left side  Rotation range of motion is full     Lower Extremity Strength  Right LE   Left LE     Quadriceps: 5/5 Quadriceps: 5/5   Hamstrings: 4+/5 Hamstrings: 4+/5   Iliospoas: 5/5 Iliospoas: 5/5   Hip extension:  4/5 Hip extension: 4/5   PGM: 4+/5 PGM: 4+/5   Hip ER:  5/5 Hip ER: 4/5   Hip IR: 4+/5 Hip IR: 4/5   Ankle dorsiflexion: 5/5 Ankle dorsiflexion: 5/5         Special Tests:  -SLR Test: negative   -Repeated Flexion: increased pain   -Repeated Ext: increased pain  -Slump Test: - bilaterally (just feels a stretch, greater on the left than the right)        Joint Mobility: normal lumbar  "segmental mobility, pain free        Palpation: tenderness at distal hamstring         Flexibility:   -Hamstring : R = no limitation ; L = no limitation        Liban Test Right  Left    Iliopsoas - -   Rectus Femoris  - +            Intake Outcome Measure for FOTO left thigh pain Survey     Therapist reviewed FOTO scores for Nimo Miller on 3/10/2025.   FOTO report - see Media section or FOTO account episode details.     Intake Score: 67%           Treatment     Nimo Barrera received the treatments listed below:    Bold=performed  therapeutic exercises to develop strength, endurance, ROM, flexibility, posture, and core stabilization for 25 minutes including:  Adductor stretch 2x30" B  hamstring stretch 3x30" each   Single limb bridges 1x15 B   sidelying clamshells +green band 2x10x3" B  sidelying hip abduction 2x10 B   prone hamstring curl in mid range with 10# dumbbell between feet 2x15   seated hip internal rotation and external rotation 1# x15 each B  seated hamstring curls with red band 2x10 B   lateral band walks with +green band at knees x2 laps at mirror   paloff press with 2 red bands 2x10 B   reverse lunge with TRX x15 B    +hip thrust on swiss ball 10# 2x10   upright bike x5 min Level 2  Measurements       manual therapy techniques: Joint mobilizations, Manual traction, and Soft tissue Mobilization were applied for 0 minutes, including:        neuromuscular re-education activities to improve: Balance, Coordination, Kinesthetic, Sense, Proprioception, and Posture for 15 minutes. The following activities were included:  Posterior pelvic tilt 20x3"  Glute sets 20x3"  Supine sciatic nerve glides 2x10 B  Hamstring sets 20x3" B  DKTC with swiss ball for hamstring activation 2x10  hamstring curls (in bridge position) on swiss ball 2x10   single limb squat with furniture slider and yellow dowel 2x5   hip hinge with dowel 2x10   sled push/pull 25# x2 laps  SLS on blue airex 2x30" B      Consider:  Hops to single " limb, lateral bounding     therapeutic activities to improve functional performance for 0 minutes, including:  sled push/pull x1 lap       Patient Education and Home Exercises       Education provided:   - updated HEP    Written Home Exercises Provided: Pt instructed to continue prior HEP + add hamstring curls. Exercises were reviewed and Nimo Barrera was able to demonstrate them prior to the end of the session.  Nimo Barrera demonstrated good  understanding of the education provided. See Electronic Medical Record under Patient Instructions for exercises provided during therapy sessions    Access Code: [email or web address]  URL: [email or web address]  Date: 03/10/2025  Prepared by: Laura Bonilla    Exercises  - Prone Hamstring Curl with Anchored Resistance  - 1 x daily - 2 sets - 10 reps    Assessment     Nimo Barrera is continues to do well with exercises in session today. She tolerated hamstirng stretch which previously causing small increases in pain and was able to add hip thrust for increased challenge to the glutes. Reassessment reveals continued improvements in lower extremity strength. Will continue to progress load to the hamstrings over the next 4 weeks prior to discharge.     Nimo Barrera Is progressing well towards her goals.   Patient prognosis is Good.     Patient will continue to benefit from skilled outpatient physical therapy to address the deficits listed in the problem list box on initial evaluation, provide pt/family education and to maximize pt's level of independence in the home and community environment.     Patient's spiritual, cultural and educational needs considered and pt agreeable to plan of care and goals.     Anticipated barriers to physical therapy: none    Goals: (not met, progressing unless otherwise specified)     Short Term Goals:  5 weeks  1.Report decreased left leg pain  < / =  4/10  to increase tolerance for stretching  2. Increase knee ROM to  in order to be able to perform ADLs without  difficulty.  3. Increase strength by 1/3 MMT grade in bilateral lower extremity to increase tolerance for ADL and work activities.  4. Pt to tolerate HEP to improve ROM and independence with ADL's     Long Term Goals: 10 weeks  1.Report decreased left leg pain < / = 2/10  to increase tolerance for participating in burlesque  2.Patient to demonstrate negative neural tension testing   3.Increase strength to >/= 4/5 in bilateral lower extremity to increase tolerance for ADL and work activities.  4. Pt will report at CJ level (20-40% impaired) on FOTO knee to demonstrate increase in LE function with every day tasks.     Plan     Continue plan of care    Laura Bonilla, PT, DPT

## 2025-03-28 ENCOUNTER — CLINICAL SUPPORT (OUTPATIENT)
Dept: REHABILITATION | Facility: HOSPITAL | Age: 40
End: 2025-03-28
Payer: COMMERCIAL

## 2025-03-28 DIAGNOSIS — M62.81 MUSCLE WEAKNESS OF EXTREMITY: ICD-10-CM

## 2025-03-28 DIAGNOSIS — M79.652 LEFT THIGH PAIN: Primary | ICD-10-CM

## 2025-03-28 DIAGNOSIS — Z74.09 IMPAIRED FUNCTIONAL MOBILITY, BALANCE, GAIT, AND ENDURANCE: ICD-10-CM

## 2025-03-28 PROCEDURE — 97112 NEUROMUSCULAR REEDUCATION: CPT | Mod: PO

## 2025-03-28 PROCEDURE — 97110 THERAPEUTIC EXERCISES: CPT | Mod: PO

## 2025-03-28 NOTE — PROGRESS NOTES
"[facility]   Physical Therapy Treatment Note     Name: Nimo Barrera [place]  Clinic Number: [id number]    Therapy Diagnosis:   Encounter Diagnoses   Name Primary?    Left thigh pain Yes    Impaired functional mobility, balance, gait, and endurance     Muscle weakness of extremity                    Physician: Flakita Geronimo, *    Visit Date: 3/31/2025    Physician Orders: PT Eval and Treat   Medical Diagnosis from Referral: Left thigh pain [M79.652]   Evaluation Date: 1/10/2025  Authorization Period Expiration: 12/31/25  Plan of Care Expiration: 4/10/25  Progress Note Due: 4/28/25  Date of Surgery: N/A  Visit # / Visits authorized: 9/ 12   FOTO: 2/ 3     Precautions: Standard      Time In: 9:05  Time Out: 9:50  Total Billable Time: 45 minutes    PTA Visit #: 0/5       Subjective     Patient reports: did a show that went great. D id not have any significant pain, but did have some soreness.   She was compliant with home exercise program.  Response to previous treatment: a little sore   Functional change: more flexibility/movement     Pain: 1/10  Location: left hamstring/sciatic      Objective      Objective measures taken at progress report unless specified otherwise.     Treatment     Nimo Barrera received the treatments listed below:    Bold=performed  therapeutic exercises to develop strength, endurance, ROM, flexibility, posture, and core stabilization for 30 minutes including:  Adductor stretch 2x30" B  hamstring stretch 3x30" each   Single limb bridges 1x15 B   +frog bridges 2x10  sidelying clamshells +green band 2x10x3" B  sidelying hip abduction 2x10 B   +sidelying hip adduction 2x10 B   prone hamstring curl in mid range with 10# dumbbell between feet 2x15   seated hip internal rotation and external rotation 1# x15 each B  seated hamstring curls with red band 2x10 B   lateral band walks with +green band at knees x2 laps at mirror   paloff press with 2 red bands 2x10 B   reverse " "lunge with TRX x15 B    hip thrust on swiss ball 10# 2x10   upright bike x5 min Level 2  Measurements       manual therapy techniques: Joint mobilizations, Manual traction, and Soft tissue Mobilization were applied for 0 minutes, including:        neuromuscular re-education activities to improve: Balance, Coordination, Kinesthetic, Sense, Proprioception, and Posture for 15 minutes. The following activities were included:  Posterior pelvic tilt 20x3"  Glute sets 20x3"  Supine sciatic nerve glides 2x10 B  Hamstring sets 20x3" B  DKTC with swiss ball for hamstring activation 2x10  hamstring curls (in bridge position) on swiss ball 2x10   single limb squat with furniture slider and yellow dowel 2x5   hip hinge with dowel 2x10   sled push/pull 25# x2 laps  SLS on blue airex 2x30" B   +eccentric hamstring bridges with sliders 2x10      Consider:  Hops to single limb, lateral bounding     therapeutic activities to improve functional performance for 0 minutes, including:         Patient Education and Home Exercises       Education provided:   - updated HEP    Written Home Exercises Provided: Pt instructed to continue prior HEP + add hamstring curls. Exercises were reviewed and Nimo Barrera was able to demonstrate them prior to the end of the session.  Nimo Barrera demonstrated good  understanding of the education provided. See Electronic Medical Record under Patient Instructions for exercises provided during therapy sessions    Access Code: [email or web address]  URL: [email or web address]  Date: 03/10/2025  Prepared by: Laura Bonilla    Exercises  - Prone Hamstring Curl with Anchored Resistance  - 1 x daily - 2 sets - 10 reps    Assessment     Nimo Barrera is adequately challenged by session today with addition of eccentric hamstring activity. She reports ability to complete last show with some soreness, but no increases in pain. Will continue to progress hamstring loading as tolerated.     Nimo Barrera Is progressing well towards her " goals.   Patient prognosis is Good.     Patient will continue to benefit from skilled outpatient physical therapy to address the deficits listed in the problem list box on initial evaluation, provide pt/family education and to maximize pt's level of independence in the home and community environment.     Patient's spiritual, cultural and educational needs considered and pt agreeable to plan of care and goals.     Anticipated barriers to physical therapy: none    Goals: (not met, progressing unless otherwise specified)     Short Term Goals:  5 weeks  1.Report decreased left leg pain  < / =  4/10  to increase tolerance for stretching  2. Increase knee ROM to  in order to be able to perform ADLs without difficulty.  3. Increase strength by 1/3 MMT grade in bilateral lower extremity to increase tolerance for ADL and work activities.  4. Pt to tolerate HEP to improve ROM and independence with ADL's     Long Term Goals: 10 weeks  1.Report decreased left leg pain < / = 2/10  to increase tolerance for participating in burlesque  2.Patient to demonstrate negative neural tension testing   3.Increase strength to >/= 4/5 in bilateral lower extremity to increase tolerance for ADL and work activities.  4. Pt will report at CJ level (20-40% impaired) on FOTO knee to demonstrate increase in LE function with every day tasks.     Plan     Continue plan of care    Laura Bonilla, PT, DPT

## 2025-03-31 ENCOUNTER — CLINICAL SUPPORT (OUTPATIENT)
Dept: REHABILITATION | Facility: HOSPITAL | Age: 40
End: 2025-03-31
Payer: COMMERCIAL

## 2025-03-31 DIAGNOSIS — M79.652 LEFT THIGH PAIN: Primary | ICD-10-CM

## 2025-03-31 DIAGNOSIS — Z74.09 IMPAIRED FUNCTIONAL MOBILITY, BALANCE, GAIT, AND ENDURANCE: ICD-10-CM

## 2025-03-31 DIAGNOSIS — M62.81 MUSCLE WEAKNESS OF EXTREMITY: ICD-10-CM

## 2025-03-31 PROCEDURE — 97110 THERAPEUTIC EXERCISES: CPT | Mod: PO

## 2025-03-31 PROCEDURE — 97112 NEUROMUSCULAR REEDUCATION: CPT | Mod: PO

## 2025-04-04 NOTE — PROGRESS NOTES
"[facility]   Physical Therapy Treatment Note     Name: Nimo Barrera [facility] Number: [id number]    Therapy Diagnosis:   Encounter Diagnoses   Name Primary?    Left thigh pain Yes    Impaired functional mobility, balance, gait, and endurance     Muscle weakness of extremity                      Physician: Flakita Geronimo, *    Visit Date: 4/7/2025    Physician Orders: PT Eval and Treat   Medical Diagnosis from Referral: Left thigh pain [M79.652]   Evaluation Date: 1/10/2025  Authorization Period Expiration: 12/31/25  Plan of Care Expiration: 4/10/25  Progress Note Due: 4/28/25  Date of Surgery: N/A  Visit # / Visits authorized: 10/ 12   FOTO: 2/ 3     Precautions: Standard      Time In: 10:05  Time Out: 10:49  Total Billable Time: 44 minutes    PTA Visit #: 0/5       Subjective     Patient reports: a little achy, but nothing different than usual. Still in the same spot.   She was compliant with home exercise program.  Response to previous treatment: a little sore   Functional change: more flexibility/movement     Pain: 1/10  Location: left hamstring/sciatic      Objective      Objective measures taken at progress report unless specified otherwise.     Treatment     Nimo Barrera received the treatments listed below:    Bold=performed  therapeutic exercises to develop strength, endurance, ROM, flexibility, posture, and core stabilization for 24 minutes including:  Adductor stretch 2x30" B  hamstring stretch 3x30" each   Single limb bridges 1x15 B   frog bridges 2x10  sidelying clamshells with green band 2x10x3" B  sidelying hip abduction 2x10 B   sidelying hip adduction 2x10 B   prone hamstring curl in mid range with 10# dumbbell between feet 2x15   seated hip internal rotation and external rotation 1# x15 each B  seated hamstring curls with red band 2x10 B   lateral band walks with green band at knees x2 laps at mirror   paloff press with 2 red bands 2x10 B   reverse lunge with TRX " "x15 B    hip thrust on swiss ball 10# 2x10   upright bike x5 min Level 2  Measurements       manual therapy techniques: Joint mobilizations, Manual traction, and Soft tissue Mobilization were applied for 0 minutes, including:        neuromuscular re-education activities to improve: Balance, Coordination, Kinesthetic, Sense, Proprioception, and Posture for 20 minutes. The following activities were included:  Posterior pelvic tilt 20x3"  Glute sets 20x3"  Supine sciatic nerve glides 2x10 B  Hamstring sets 20x3" B  DKTC with swiss ball for hamstring activation 2x10  hamstring curls (in bridge position) on swiss ball 2x10   single limb squat with furniture slider and yellow dowel 2x5   hip hinge with dowel 2x10   sled push/pull (black sled) +45# x2 laps  SLS on blue airex 2x30" B   eccentric hamstring bridges with sliders 2x10      Consider:  Hops to single limb, lateral bounding     therapeutic activities to improve functional performance for 0 minutes, including:         Patient Education and Home Exercises       Education provided:   - updated HEP    Written Home Exercises Provided: Pt instructed to continue prior HEP + add hamstring curls. Exercises were reviewed and Nimo Barrera was able to demonstrate them prior to the end of the session.  Nimo Barrera demonstrated good  understanding of the education provided. See Electronic Medical Record under Patient Instructions for exercises provided during therapy sessions    Access Code: [email or web address]  URL: [email or web address]  Date: 03/10/2025  Prepared by: Laura Bonilla    Exercises  - Prone Hamstring Curl with Anchored Resistance  - 1 x daily - 2 sets - 10 reps    Assessment     Patient demonstrates good tolerance to progressions today and although challenging, she is able to complete. She also demonstrates ability to complete both eccentric and concentric portion of slider hamstring bridge today indicating improved strength overall. Continue to progress.     Nimo Barrera " Is progressing well towards her goals.   Patient prognosis is Good.     Patient will continue to benefit from skilled outpatient physical therapy to address the deficits listed in the problem list box on initial evaluation, provide pt/family education and to maximize pt's level of independence in the home and community environment.     Patient's spiritual, cultural and educational needs considered and pt agreeable to plan of care and goals.     Anticipated barriers to physical therapy: none    Goals: (not met, progressing unless otherwise specified)     Short Term Goals:  5 weeks  1.Report decreased left leg pain  < / =  4/10  to increase tolerance for stretching  2. Increase knee ROM to  in order to be able to perform ADLs without difficulty.  3. Increase strength by 1/3 MMT grade in bilateral lower extremity to increase tolerance for ADL and work activities.  4. Pt to tolerate HEP to improve ROM and independence with ADL's     Long Term Goals: 10 weeks  1.Report decreased left leg pain < / = 2/10  to increase tolerance for participating in burlesque  2.Patient to demonstrate negative neural tension testing   3.Increase strength to >/= 4/5 in bilateral lower extremity to increase tolerance for ADL and work activities.  4. Pt will report at CJ level (20-40% impaired) on FOTO knee to demonstrate increase in LE function with every day tasks.     Plan     Continue plan of care    Laura Bonilla, PT, DPT

## 2025-04-07 ENCOUNTER — CLINICAL SUPPORT (OUTPATIENT)
Dept: REHABILITATION | Facility: HOSPITAL | Age: 40
End: 2025-04-07
Payer: COMMERCIAL

## 2025-04-07 DIAGNOSIS — M79.652 LEFT THIGH PAIN: Primary | ICD-10-CM

## 2025-04-07 DIAGNOSIS — Z74.09 IMPAIRED FUNCTIONAL MOBILITY, BALANCE, GAIT, AND ENDURANCE: ICD-10-CM

## 2025-04-07 DIAGNOSIS — M62.81 MUSCLE WEAKNESS OF EXTREMITY: ICD-10-CM

## 2025-04-07 PROCEDURE — 97112 NEUROMUSCULAR REEDUCATION: CPT | Mod: PO

## 2025-04-07 PROCEDURE — 97110 THERAPEUTIC EXERCISES: CPT | Mod: PO

## 2025-04-23 NOTE — PROGRESS NOTES
[facility]   Physical Therapy Treatment Note     Name: Nimo Miller  [facility] Number: [id number]    Therapy Diagnosis:   No diagnosis found.                    Physician: Flakita Geronimo, *    Visit Date: 4/24/2025    Physician Orders: PT Eval and Treat   Medical Diagnosis from Referral: Left thigh pain [M79.652]   Evaluation Date: 1/10/2025  Authorization Period Expiration: 12/31/25  Plan of Care Expiration: 4/10/25  Progress Note Due: 4/28/25  Date of Surgery: N/A  Visit # / Visits authorized: 11/ 12   FOTO: 2/ 3     Precautions: Standard      Time In: 9:05  Time Out: 9:40  Total Billable Time: 35 minutes    PTA Visit #: 0/5       Subjective     Patient reports: hamstring has been ok even when walking   She was compliant with home exercise program.  Response to previous treatment: a little sore   Functional change: more flexibility/movement     Pain: 1/10  Location: left hamstring/sciatic      Objective         Posture:  WFL     Lumbar Range of Motion:     Limitations Pain   Flexion 0%    no         Extension 0%    No          Left Side Bending 0% no         Right Side Bending 0% no            Right rotation: pain in left side  Rotation range of motion is full     Lower Extremity Strength  Right LE   Left LE     Quadriceps: 5/5 Quadriceps: 5/5   Hamstrings: 5/5 Hamstrings: 5/5   Iliospoas: 5/5 Iliospoas: 5/5   Hip extension:  5/5 Hip extension: 4+/5   PGM: 4+/5 PGM: 4+/5   Hip ER:  5/5 Hip ER: 4+/5   Hip IR: 4+/5 Hip IR: 4+/5   Ankle dorsiflexion: 5/5 Ankle dorsiflexion: 5/5         Special Tests:  -SLR Test: negative   -Slump Test: - bilaterally         Joint Mobility: normal lumbar segmental mobility, pain free        Palpation: tenderness at distal hamstring         Flexibility:   -Hamstring : R = no limitation ; L = no limitation        Liban Test Right  Left    Iliopsoas - -   Rectus Femoris  - +            Intake Outcome Measure for FOTO left thigh pain Survey    "  Therapist reviewed FOTO scores for Nimo Miller on 3/10/2025.   FOTO report - see Media section or FOTO account episode details.     Intake Score: 67%           Treatment     Nimo Barrera received the treatments listed below:    Bold=performed  therapeutic exercises to develop strength, endurance, ROM, flexibility, posture, and core stabilization for 25 minutes including:  Adductor stretch 2x30" B  hamstring stretch 3x30" each   Single limb bridges 1x15 B   frog bridges 2x10  sidelying clamshells with green band 2x10x3" B  sidelying hip abduction 2x10 B   sidelying hip adduction 2x10 B   prone hamstring curl in mid range with 10# dumbbell between feet 2x15   seated hip internal rotation and external rotation 1# x15 each B  seated hamstring curls with red band 2x10 B   lateral band walks with green band at knees x2 laps at mirror   paloff press with 2 red bands 2x10 B   reverse lunge with TRX x15 B    hip thrust on swiss ball 10# 2x10   upright bike x5 min Level 2  Measurements       manual therapy techniques: Joint mobilizations, Manual traction, and Soft tissue Mobilization were applied for 0 minutes, including:        neuromuscular re-education activities to improve: Balance, Coordination, Kinesthetic, Sense, Proprioception, and Posture for 10 minutes. The following activities were included:  Posterior pelvic tilt 20x3"  Glute sets 20x3"  Supine sciatic nerve glides 2x10 B  Hamstring sets 20x3" B  DKTC with swiss ball for hamstring activation 2x10  hamstring curls (in bridge position) on swiss ball 2x10   single limb squat with furniture slider and yellow dowel 2x5   hip hinge with dowel 2x10   sled push/pull (black sled) +45# x2 laps  SLS on blue airex 3x30" B   eccentric hamstring bridges with sliders 2x10      Consider:  Hops to single limb, lateral bounding     therapeutic activities to improve functional performance for 0 minutes, including:         Patient Education and Home Exercises       Education " provided:   - updated HEP    Written Home Exercises Provided: Pt instructed to continue prior HEP + add hamstring curls. Exercises were reviewed and Nimo Barrera was able to demonstrate them prior to the end of the session.  Nimo Barrera demonstrated good  understanding of the education provided. See Electronic Medical Record under Patient Instructions for exercises provided during therapy sessions    Access Code: [email or web address]  URL: [email or web address]  Date: 04/24/2025  Prepared by: Laura Bonilla    Exercises  - Clamshell  - 1 x daily - 2 sets - 10 reps - 3 hold  - Single Leg Bridge  - 1 x daily - 1 sets - 15 reps  - Bridge in Hip Abduction and External Rotation  - 1 x daily - 2 sets - 10 reps  - Side Stepping with Resistance at Ankles  - 1 x daily - 2 sets - 10 reps  - Sidelying Hip Abduction  - 1 x daily - 2 sets - 10 reps    Assessment     Patient has done very well under PT plan of care and has been able to participate in at least 2 burlesque shows without lasting hamstring pain or significant limitation during the show. She completes exercises well within session demonstrating very good control and limited need for verbal/tactile cues for form. Assessment reveals continued improvements in strength suggesting compliance with HEP and overall improvement in condition. Updated HEP and provided printed copy today. Pt reports that she has appropriate therabands at home. Seven of eight goals met. She is ready for discharge today.     Nimo Barrera Is progressing well towards her goals.   Patient prognosis is Good.     Patient will continue to benefit from skilled outpatient physical therapy to address the deficits listed in the problem list box on initial evaluation, provide pt/family education and to maximize pt's level of independence in the home and community environment.     Patient's spiritual, cultural and educational needs considered and pt agreeable to plan of care and goals.     Anticipated barriers to physical  therapy: none    Goals: (not met, progressing unless otherwise specified)     Short Term Goals:  5 weeks  1.Report decreased left leg pain  < / =  4/10  to increase tolerance for stretching MET  2. Increase knee ROM to  in order to be able to perform ADLs without difficulty. Not assessed  3. Increase strength by 1/3 MMT grade in bilateral lower extremity to increase tolerance for ADL and work activities. MET  4. Pt to tolerate HEP to improve ROM and independence with ADL's MET     Long Term Goals: 10 weeks  1.Report decreased left leg pain < / = 2/10  to increase tolerance for participating in [facility] MET  2.Patient to demonstrate negative neural tension testing  MET  3.Increase strength to >/= 4/5 in bilateral lower extremity to increase tolerance for ADL and work activities. MET  4. Pt will report at CJ level (20-40% impaired) on FOTO knee to demonstrate increase in LE function with every day tasks. MET    Plan     Patient is discharged from skilled PT today    Laura Bonilla, PT, DPT

## 2025-04-24 ENCOUNTER — CLINICAL SUPPORT (OUTPATIENT)
Dept: REHABILITATION | Facility: HOSPITAL | Age: 40
End: 2025-04-24
Payer: COMMERCIAL

## 2025-04-24 DIAGNOSIS — Z74.09 IMPAIRED FUNCTIONAL MOBILITY, BALANCE, GAIT, AND ENDURANCE: ICD-10-CM

## 2025-04-24 DIAGNOSIS — M79.652 LEFT THIGH PAIN: Primary | ICD-10-CM

## 2025-04-24 DIAGNOSIS — M62.81 MUSCLE WEAKNESS OF EXTREMITY: ICD-10-CM

## 2025-04-24 PROCEDURE — 97110 THERAPEUTIC EXERCISES: CPT | Mod: PO

## 2025-04-24 PROCEDURE — 97112 NEUROMUSCULAR REEDUCATION: CPT | Mod: PO

## 2025-05-14 DIAGNOSIS — Z12.31 OTHER SCREENING MAMMOGRAM: ICD-10-CM

## 2025-07-08 ENCOUNTER — LAB VISIT (OUTPATIENT)
Dept: LAB | Facility: HOSPITAL | Age: 40
End: 2025-07-08
Attending: FAMILY MEDICINE
Payer: COMMERCIAL

## 2025-07-08 ENCOUNTER — OFFICE VISIT (OUTPATIENT)
Dept: FAMILY MEDICINE | Facility: CLINIC | Age: 40
End: 2025-07-08
Attending: FAMILY MEDICINE
Payer: COMMERCIAL

## 2025-07-08 ENCOUNTER — PATIENT MESSAGE (OUTPATIENT)
Dept: FAMILY MEDICINE | Facility: CLINIC | Age: 40
End: 2025-07-08
Payer: COMMERCIAL

## 2025-07-08 VITALS
WEIGHT: 167 LBS | DIASTOLIC BLOOD PRESSURE: 71 MMHG | BODY MASS INDEX: 28.67 KG/M2 | HEART RATE: 85 BPM | SYSTOLIC BLOOD PRESSURE: 105 MMHG | OXYGEN SATURATION: 100 %

## 2025-07-08 DIAGNOSIS — Z00.00 ANNUAL PHYSICAL EXAM: ICD-10-CM

## 2025-07-08 DIAGNOSIS — Z00.00 ANNUAL PHYSICAL EXAM: Primary | ICD-10-CM

## 2025-07-08 LAB
ABSOLUTE EOSINOPHIL (OHS): 0.24 K/UL
ABSOLUTE MONOCYTE (OHS): 0.35 K/UL (ref 0.3–1)
ABSOLUTE NEUTROPHIL COUNT (OHS): 5.78 K/UL (ref 1.8–7.7)
ALBUMIN SERPL BCP-MCNC: 4.4 G/DL (ref 3.5–5.2)
ALBUMIN/CREAT UR: NORMAL
ALP SERPL-CCNC: 67 UNIT/L (ref 40–150)
ALT SERPL W/O P-5'-P-CCNC: 13 UNIT/L (ref 10–44)
ANION GAP (OHS): 11 MMOL/L (ref 8–16)
AST SERPL-CCNC: 18 UNIT/L (ref 11–45)
BASOPHILS # BLD AUTO: 0.06 K/UL
BASOPHILS NFR BLD AUTO: 0.8 %
BILIRUB SERPL-MCNC: 0.2 MG/DL (ref 0.1–1)
BUN SERPL-MCNC: 23 MG/DL (ref 6–20)
CALCIUM SERPL-MCNC: 9.2 MG/DL (ref 8.7–10.5)
CHLORIDE SERPL-SCNC: 104 MMOL/L (ref 95–110)
CO2 SERPL-SCNC: 24 MMOL/L (ref 23–29)
CREAT SERPL-MCNC: 0.8 MG/DL (ref 0.5–1.4)
CREAT UR-MCNC: 104 MG/DL (ref 15–325)
EAG (OHS): 91 MG/DL (ref 68–131)
ERYTHROCYTE [DISTWIDTH] IN BLOOD BY AUTOMATED COUNT: 12.5 % (ref 11.5–14.5)
GFR SERPLBLD CREATININE-BSD FMLA CKD-EPI: >60 ML/MIN/1.73/M2
GLUCOSE SERPL-MCNC: 82 MG/DL (ref 70–110)
HBA1C MFR BLD: 4.8 % (ref 4–5.6)
HCT VFR BLD AUTO: 42.5 % (ref 37–48.5)
HGB BLD-MCNC: 13.6 GM/DL (ref 12–16)
IMM GRANULOCYTES # BLD AUTO: 0.03 K/UL (ref 0–0.04)
IMM GRANULOCYTES NFR BLD AUTO: 0.4 % (ref 0–0.5)
LYMPHOCYTES # BLD AUTO: 1.48 K/UL (ref 1–4.8)
MCH RBC QN AUTO: 30.5 PG (ref 27–31)
MCHC RBC AUTO-ENTMCNC: 32 G/DL (ref 32–36)
MCV RBC AUTO: 95 FL (ref 82–98)
MICROALBUMIN UR-MCNC: <5 UG/ML (ref ?–5000)
NUCLEATED RBC (/100WBC) (OHS): 0 /100 WBC
PLATELET # BLD AUTO: 278 K/UL (ref 150–450)
PMV BLD AUTO: 10.3 FL (ref 9.2–12.9)
POTASSIUM SERPL-SCNC: 4.6 MMOL/L (ref 3.5–5.1)
PROT SERPL-MCNC: 7.5 GM/DL (ref 6–8.4)
RBC # BLD AUTO: 4.46 M/UL (ref 4–5.4)
RELATIVE EOSINOPHIL (OHS): 3 %
RELATIVE LYMPHOCYTE (OHS): 18.6 % (ref 18–48)
RELATIVE MONOCYTE (OHS): 4.4 % (ref 4–15)
RELATIVE NEUTROPHIL (OHS): 72.8 % (ref 38–73)
SODIUM SERPL-SCNC: 139 MMOL/L (ref 136–145)
TSH SERPL-ACNC: 1.48 UIU/ML (ref 0.4–4)
WBC # BLD AUTO: 7.94 K/UL (ref 3.9–12.7)

## 2025-07-08 PROCEDURE — 3074F SYST BP LT 130 MM HG: CPT | Mod: CPTII,S$GLB,, | Performed by: FAMILY MEDICINE

## 2025-07-08 PROCEDURE — 1159F MED LIST DOCD IN RCRD: CPT | Mod: CPTII,S$GLB,, | Performed by: FAMILY MEDICINE

## 2025-07-08 PROCEDURE — 85025 COMPLETE CBC W/AUTO DIFF WBC: CPT

## 2025-07-08 PROCEDURE — 3078F DIAST BP <80 MM HG: CPT | Mod: CPTII,S$GLB,, | Performed by: FAMILY MEDICINE

## 2025-07-08 PROCEDURE — 80053 COMPREHEN METABOLIC PANEL: CPT

## 2025-07-08 PROCEDURE — 99999 PR PBB SHADOW E&M-EST. PATIENT-LVL III: CPT | Mod: PBBFAC,,, | Performed by: FAMILY MEDICINE

## 2025-07-08 PROCEDURE — 83036 HEMOGLOBIN GLYCOSYLATED A1C: CPT

## 2025-07-08 PROCEDURE — 36415 COLL VENOUS BLD VENIPUNCTURE: CPT | Mod: PO

## 2025-07-08 PROCEDURE — 84443 ASSAY THYROID STIM HORMONE: CPT

## 2025-07-08 PROCEDURE — 82570 ASSAY OF URINE CREATININE: CPT | Performed by: FAMILY MEDICINE

## 2025-07-08 PROCEDURE — 3008F BODY MASS INDEX DOCD: CPT | Mod: CPTII,S$GLB,, | Performed by: FAMILY MEDICINE

## 2025-07-08 PROCEDURE — 99396 PREV VISIT EST AGE 40-64: CPT | Mod: S$GLB,,, | Performed by: FAMILY MEDICINE

## 2025-07-08 PROCEDURE — 3044F HG A1C LEVEL LT 7.0%: CPT | Mod: CPTII,S$GLB,, | Performed by: FAMILY MEDICINE

## 2025-07-08 RX ORDER — METHYLPHENIDATE HYDROCHLORIDE 54 MG/1
54 TABLET, EXTENDED RELEASE ORAL
COMMUNITY
Start: 2025-07-03

## 2025-07-08 RX ORDER — ARIPIPRAZOLE 10 MG/1
10 TABLET ORAL NIGHTLY
COMMUNITY
Start: 2025-07-03

## 2025-07-08 RX ORDER — METHYLPHENIDATE HYDROCHLORIDE 20 MG/1
20 TABLET ORAL 2 TIMES DAILY
COMMUNITY
Start: 2025-07-03

## 2025-07-10 DIAGNOSIS — Z13.220 SCREENING FOR HYPERCHOLESTEROLEMIA: Primary | ICD-10-CM

## 2025-07-10 NOTE — TELEPHONE ENCOUNTER
Called patient to schedule lab.  
Patient needs a order for  cholesterol labs   
suction curettage for missed

## 2025-07-16 ENCOUNTER — LAB VISIT (OUTPATIENT)
Dept: LAB | Facility: HOSPITAL | Age: 40
End: 2025-07-16
Attending: FAMILY MEDICINE
Payer: COMMERCIAL

## 2025-07-16 DIAGNOSIS — Z13.220 SCREENING FOR HYPERCHOLESTEROLEMIA: ICD-10-CM

## 2025-07-16 LAB
CHOLEST SERPL-MCNC: 197 MG/DL (ref 120–199)
CHOLEST/HDLC SERPL: 4.1 {RATIO} (ref 2–5)
HDLC SERPL-MCNC: 48 MG/DL (ref 40–75)
HDLC SERPL: 24.4 % (ref 20–50)
LDLC SERPL CALC-MCNC: 138.2 MG/DL (ref 63–159)
NONHDLC SERPL-MCNC: 149 MG/DL
TRIGL SERPL-MCNC: 54 MG/DL (ref 30–150)

## 2025-07-16 PROCEDURE — 82465 ASSAY BLD/SERUM CHOLESTEROL: CPT

## 2025-07-16 PROCEDURE — 36415 COLL VENOUS BLD VENIPUNCTURE: CPT | Mod: PO

## 2025-08-06 NOTE — PROGRESS NOTES
Subjective:       Patient ID: Nimo Miller is a 40 y.o. female.    Chief Complaint: Annual Exam    HPI  Pt is here for annual exam pt is generally well no sob/cp cough chest congestion sore throat uri symptoms  Pt denies dysuria hematuria no abnl vaginal bleeding  Pt denies n/v/f/c/d/c no change in bowel habits no brbpr  Pt has anxiety depression stable on meds followed in psych no si/hi no panic attacks  Review of Systems   Constitutional:  Negative for activity change, chills, diaphoresis, fatigue and fever.   HENT:  Negative for congestion, ear discharge, ear pain, hearing loss, postnasal drip, rhinorrhea, sinus pressure, sneezing, sore throat, trouble swallowing and voice change.    Eyes:  Negative for photophobia, discharge, redness, itching and visual disturbance.   Respiratory:  Negative for cough, chest tightness, shortness of breath and wheezing.    Cardiovascular:  Negative for chest pain, palpitations and leg swelling.   Gastrointestinal:  Negative for abdominal pain, anal bleeding, blood in stool, constipation, diarrhea, nausea, rectal pain and vomiting.   Genitourinary:  Negative for dyspareunia, dysuria, flank pain, frequency, hematuria, menstrual problem, pelvic pain, urgency, vaginal bleeding and vaginal discharge.   Musculoskeletal:  Negative for arthralgias, back pain, joint swelling and neck pain.   Skin:  Negative for color change and rash.   Neurological:  Negative for dizziness, speech difficulty, weakness, light-headedness, numbness and headaches.   Hematological:  Does not bruise/bleed easily.   Psychiatric/Behavioral:  Negative for agitation, confusion, decreased concentration, sleep disturbance and suicidal ideas. The patient is not nervous/anxious.        Objective:    /71   Pulse 85   Wt 75.8 kg (167 lb)   LMP 10/28/2022   SpO2 100%   BMI 28.67 kg/m²     Physical Exam  Constitutional:       Appearance: Normal appearance. She is well-developed. She is not ill-appearing.  "  HENT:      Head: Normocephalic and atraumatic.      Right Ear: External ear normal.      Left Ear: External ear normal.      Nose: Nose normal.   Eyes:      General:         Right eye: No discharge.         Left eye: No discharge.      Conjunctiva/sclera: Conjunctivae normal.      Pupils: Pupils are equal, round, and reactive to light.   Neck:      Thyroid: No thyromegaly.   Cardiovascular:      Rate and Rhythm: Normal rate and regular rhythm.      Heart sounds: Normal heart sounds. No murmur heard.     No friction rub. No gallop.   Pulmonary:      Effort: Pulmonary effort is normal.      Breath sounds: Normal breath sounds. No wheezing or rales.   Abdominal:      General: Bowel sounds are normal. There is no distension.      Palpations: Abdomen is soft.      Tenderness: There is no abdominal tenderness. There is no guarding or rebound.   Genitourinary:     Vagina: Normal.      Comments: declined  Musculoskeletal:         General: No tenderness. Normal range of motion.      Cervical back: Normal range of motion and neck supple.   Lymphadenopathy:      Cervical: No cervical adenopathy.   Skin:     General: Skin is warm and dry.      Findings: No erythema or rash.   Neurological:      Mental Status: She is alert.      Cranial Nerves: No cranial nerve deficit.      Motor: No abnormal muscle tone.      Coordination: Coordination normal.   Psychiatric:         Behavior: Behavior normal.         Thought Content: Thought content normal.         Judgment: Judgment normal.         Assessment:       1. Annual physical exam        Plan:     Orders cmp cbc lipid tsh urine hgb A1c   Cont meds  Low fat diet   Graded exercise  Rtc annually    Health maintenance  Discussed with pt        "This note will not be shared with the patient."     "

## 2025-08-14 ENCOUNTER — OFFICE VISIT (OUTPATIENT)
Dept: PSYCHIATRY | Facility: CLINIC | Age: 40
End: 2025-08-14
Payer: COMMERCIAL

## 2025-08-14 VITALS
HEIGHT: 64 IN | WEIGHT: 166.69 LBS | HEART RATE: 102 BPM | DIASTOLIC BLOOD PRESSURE: 78 MMHG | SYSTOLIC BLOOD PRESSURE: 113 MMHG | BODY MASS INDEX: 28.46 KG/M2

## 2025-08-14 DIAGNOSIS — F32.81 PMDD (PREMENSTRUAL DYSPHORIC DISORDER): ICD-10-CM

## 2025-08-14 DIAGNOSIS — R53.83 FATIGUE, UNSPECIFIED TYPE: ICD-10-CM

## 2025-08-14 DIAGNOSIS — F41.9 ANXIETY AND DEPRESSION: ICD-10-CM

## 2025-08-14 DIAGNOSIS — Z11.3 SCREEN FOR SEXUALLY TRANSMITTED DISEASES: Primary | ICD-10-CM

## 2025-08-14 DIAGNOSIS — F32.A ANXIETY AND DEPRESSION: ICD-10-CM

## 2025-08-14 DIAGNOSIS — F90.0 ATTENTION DEFICIT HYPERACTIVITY DISORDER (ADHD), PREDOMINANTLY INATTENTIVE TYPE: ICD-10-CM

## 2025-08-14 PROCEDURE — 99999 PR PBB SHADOW E&M-EST. PATIENT-LVL III: CPT | Mod: PBBFAC,,, | Performed by: STUDENT IN AN ORGANIZED HEALTH CARE EDUCATION/TRAINING PROGRAM

## 2025-08-14 RX ORDER — ARIPIPRAZOLE 5 MG/1
5 TABLET ORAL DAILY
Qty: 30 TABLET | Refills: 11 | Status: SHIPPED | OUTPATIENT
Start: 2025-08-14 | End: 2026-08-14

## 2025-08-14 RX ORDER — METHYLPHENIDATE HYDROCHLORIDE 10 MG/1
20 TABLET ORAL 2 TIMES DAILY
Qty: 60 TABLET | Refills: 0 | Status: SHIPPED | OUTPATIENT
Start: 2025-08-14

## 2025-08-14 RX ORDER — SERTRALINE HYDROCHLORIDE 100 MG/1
200 TABLET, FILM COATED ORAL DAILY
Qty: 180 TABLET | Refills: 3 | Status: SHIPPED | OUTPATIENT
Start: 2025-08-14

## 2025-08-14 RX ORDER — LISDEXAMFETAMINE DIMESYLATE 20 MG/1
20 CAPSULE ORAL EVERY MORNING
Qty: 30 CAPSULE | Refills: 0 | Status: SHIPPED | OUTPATIENT
Start: 2025-08-14

## 2025-08-22 ENCOUNTER — LAB VISIT (OUTPATIENT)
Dept: LAB | Facility: HOSPITAL | Age: 40
End: 2025-08-22
Attending: STUDENT IN AN ORGANIZED HEALTH CARE EDUCATION/TRAINING PROGRAM
Payer: COMMERCIAL

## 2025-08-22 DIAGNOSIS — F32.A ANXIETY AND DEPRESSION: ICD-10-CM

## 2025-08-22 DIAGNOSIS — Z11.3 SCREEN FOR SEXUALLY TRANSMITTED DISEASES: ICD-10-CM

## 2025-08-22 DIAGNOSIS — F41.9 ANXIETY AND DEPRESSION: ICD-10-CM

## 2025-08-22 LAB
ABSOLUTE EOSINOPHIL (OHS): 0.31 K/UL
ABSOLUTE MONOCYTE (OHS): 0.46 K/UL (ref 0.3–1)
ABSOLUTE NEUTROPHIL COUNT (OHS): 3.55 K/UL (ref 1.8–7.7)
ALBUMIN SERPL BCP-MCNC: 4.4 G/DL (ref 3.5–5.2)
ALP SERPL-CCNC: 64 UNIT/L (ref 40–150)
ALT SERPL W/O P-5'-P-CCNC: 15 UNIT/L (ref 0–55)
ANION GAP (OHS): 10 MMOL/L (ref 8–16)
AST SERPL-CCNC: 24 UNIT/L (ref 0–50)
BASOPHILS # BLD AUTO: 0.07 K/UL
BASOPHILS NFR BLD AUTO: 1.1 %
BILIRUB SERPL-MCNC: 0.5 MG/DL (ref 0.1–1)
BUN SERPL-MCNC: 21 MG/DL (ref 6–20)
CALCIUM SERPL-MCNC: 9.4 MG/DL (ref 8.7–10.5)
CHLORIDE SERPL-SCNC: 104 MMOL/L (ref 95–110)
CHOLEST SERPL-MCNC: 198 MG/DL (ref 120–199)
CHOLEST/HDLC SERPL: 3.7 {RATIO} (ref 2–5)
CO2 SERPL-SCNC: 26 MMOL/L (ref 23–29)
CREAT SERPL-MCNC: 0.9 MG/DL (ref 0.5–1.4)
EAG (OHS): 85 MG/DL (ref 68–131)
ERYTHROCYTE [DISTWIDTH] IN BLOOD BY AUTOMATED COUNT: 12.2 % (ref 11.5–14.5)
FERRITIN SERPL-MCNC: 69 NG/ML (ref 20–300)
GFR SERPLBLD CREATININE-BSD FMLA CKD-EPI: >60 ML/MIN/1.73/M2
GLUCOSE SERPL-MCNC: 82 MG/DL (ref 70–110)
HBA1C MFR BLD: 4.6 % (ref 4–5.6)
HCT VFR BLD AUTO: 42.2 % (ref 37–48.5)
HDLC SERPL-MCNC: 54 MG/DL (ref 40–75)
HDLC SERPL: 27.3 % (ref 20–50)
HGB BLD-MCNC: 13.9 GM/DL (ref 12–16)
HIV 1+2 AB+HIV1 P24 AG SERPL QL IA: NORMAL
IMM GRANULOCYTES # BLD AUTO: 0.02 K/UL (ref 0–0.04)
IMM GRANULOCYTES NFR BLD AUTO: 0.3 % (ref 0–0.5)
IRON SATN MFR SERPL: 40 % (ref 20–50)
IRON SERPL-MCNC: 127 UG/DL (ref 30–160)
LDLC SERPL CALC-MCNC: 131 MG/DL (ref 63–159)
LYMPHOCYTES # BLD AUTO: 1.89 K/UL (ref 1–4.8)
MCH RBC QN AUTO: 30.8 PG (ref 27–31)
MCHC RBC AUTO-ENTMCNC: 32.9 G/DL (ref 32–36)
MCV RBC AUTO: 94 FL (ref 82–98)
NONHDLC SERPL-MCNC: 144 MG/DL
NUCLEATED RBC (/100WBC) (OHS): 0 /100 WBC
PLATELET # BLD AUTO: 303 K/UL (ref 150–450)
PMV BLD AUTO: 10.1 FL (ref 9.2–12.9)
POTASSIUM SERPL-SCNC: 4.7 MMOL/L (ref 3.5–5.1)
PROT SERPL-MCNC: 7.7 GM/DL (ref 6–8.4)
RBC # BLD AUTO: 4.51 M/UL (ref 4–5.4)
RELATIVE EOSINOPHIL (OHS): 4.9 %
RELATIVE LYMPHOCYTE (OHS): 30 % (ref 18–48)
RELATIVE MONOCYTE (OHS): 7.3 % (ref 4–15)
RELATIVE NEUTROPHIL (OHS): 56.4 % (ref 38–73)
SODIUM SERPL-SCNC: 140 MMOL/L (ref 136–145)
T PALLIDUM IGG+IGM SER QL: NORMAL
TIBC SERPL-MCNC: 315 UG/DL (ref 250–450)
TRANSFERRIN SERPL-MCNC: 213 MG/DL (ref 200–375)
TRIGL SERPL-MCNC: 65 MG/DL (ref 30–150)
TSH SERPL-ACNC: 1.83 UIU/ML (ref 0.4–4)
WBC # BLD AUTO: 6.3 K/UL (ref 3.9–12.7)

## 2025-08-22 PROCEDURE — 82728 ASSAY OF FERRITIN: CPT

## 2025-08-22 PROCEDURE — 83036 HEMOGLOBIN GLYCOSYLATED A1C: CPT

## 2025-08-22 PROCEDURE — 87389 HIV-1 AG W/HIV-1&-2 AB AG IA: CPT

## 2025-08-22 PROCEDURE — 84443 ASSAY THYROID STIM HORMONE: CPT

## 2025-08-22 PROCEDURE — 86593 SYPHILIS TEST NON-TREP QUANT: CPT

## 2025-08-22 PROCEDURE — 36415 COLL VENOUS BLD VENIPUNCTURE: CPT | Mod: PO

## 2025-08-22 PROCEDURE — 80061 LIPID PANEL: CPT

## 2025-08-22 PROCEDURE — 85025 COMPLETE CBC W/AUTO DIFF WBC: CPT

## 2025-08-22 PROCEDURE — 80053 COMPREHEN METABOLIC PANEL: CPT

## 2025-08-22 PROCEDURE — 84466 ASSAY OF TRANSFERRIN: CPT

## 2025-08-25 ENCOUNTER — RESULTS FOLLOW-UP (OUTPATIENT)
Dept: PSYCHIATRY | Facility: HOSPITAL | Age: 40
End: 2025-08-25
Payer: COMMERCIAL

## 2025-08-25 DIAGNOSIS — F41.9 ANXIETY AND DEPRESSION: ICD-10-CM

## 2025-08-25 DIAGNOSIS — F32.81 PMDD (PREMENSTRUAL DYSPHORIC DISORDER): ICD-10-CM

## 2025-08-25 DIAGNOSIS — F32.A ANXIETY AND DEPRESSION: ICD-10-CM

## 2025-08-25 DIAGNOSIS — F90.0 ATTENTION DEFICIT HYPERACTIVITY DISORDER (ADHD), PREDOMINANTLY INATTENTIVE TYPE: Primary | ICD-10-CM
